# Patient Record
Sex: MALE | Race: WHITE | NOT HISPANIC OR LATINO | Employment: OTHER | URBAN - METROPOLITAN AREA
[De-identification: names, ages, dates, MRNs, and addresses within clinical notes are randomized per-mention and may not be internally consistent; named-entity substitution may affect disease eponyms.]

---

## 2019-01-08 ENCOUNTER — TRANSFERRED RECORDS (OUTPATIENT)
Dept: HEALTH INFORMATION MANAGEMENT | Facility: CLINIC | Age: 64
End: 2019-01-08

## 2019-10-15 ENCOUNTER — OFFICE VISIT (OUTPATIENT)
Dept: INTERNAL MEDICINE | Facility: CLINIC | Age: 64
End: 2019-10-15
Payer: COMMERCIAL

## 2019-10-15 ENCOUNTER — TRANSFERRED RECORDS (OUTPATIENT)
Dept: INTERNAL MEDICINE | Facility: CLINIC | Age: 64
End: 2019-10-15

## 2019-10-15 VITALS
OXYGEN SATURATION: 100 % | SYSTOLIC BLOOD PRESSURE: 110 MMHG | WEIGHT: 185.2 LBS | HEIGHT: 69 IN | HEART RATE: 54 BPM | TEMPERATURE: 97.6 F | BODY MASS INDEX: 27.43 KG/M2 | DIASTOLIC BLOOD PRESSURE: 78 MMHG

## 2019-10-15 DIAGNOSIS — Z13.6 CARDIOVASCULAR SCREENING; LDL GOAL LESS THAN 130: ICD-10-CM

## 2019-10-15 DIAGNOSIS — Z12.5 SCREENING FOR PROSTATE CANCER: ICD-10-CM

## 2019-10-15 DIAGNOSIS — Z29.89 ALTITUDE SICKNESS PROPHYLAXIS: ICD-10-CM

## 2019-10-15 DIAGNOSIS — R73.01 ELEVATED FASTING GLUCOSE: ICD-10-CM

## 2019-10-15 DIAGNOSIS — E78.5 HYPERLIPIDEMIA LDL GOAL <130: ICD-10-CM

## 2019-10-15 DIAGNOSIS — Z00.00 ROUTINE GENERAL MEDICAL EXAMINATION AT A HEALTH CARE FACILITY: Primary | ICD-10-CM

## 2019-10-15 DIAGNOSIS — Z71.84 COUNSELING ABOUT TRAVEL: ICD-10-CM

## 2019-10-15 LAB
ALBUMIN SERPL-MCNC: 4.5 G/DL (ref 3.4–5)
ALP SERPL-CCNC: 55 U/L (ref 40–150)
ALT SERPL W P-5'-P-CCNC: 30 U/L (ref 0–70)
ANION GAP SERPL CALCULATED.3IONS-SCNC: 5 MMOL/L (ref 3–14)
AST SERPL W P-5'-P-CCNC: 22 U/L (ref 0–45)
BILIRUB SERPL-MCNC: 1.1 MG/DL (ref 0.2–1.3)
BUN SERPL-MCNC: 16 MG/DL (ref 7–30)
CALCIUM SERPL-MCNC: 9.7 MG/DL (ref 8.5–10.1)
CHLORIDE SERPL-SCNC: 103 MMOL/L (ref 94–109)
CHOLEST SERPL-MCNC: 158 MG/DL
CO2 SERPL-SCNC: 31 MMOL/L (ref 20–32)
CREAT SERPL-MCNC: 0.93 MG/DL (ref 0.66–1.25)
ERYTHROCYTE [DISTWIDTH] IN BLOOD BY AUTOMATED COUNT: 12.2 % (ref 10–15)
GFR SERPL CREATININE-BSD FRML MDRD: 87 ML/MIN/{1.73_M2}
GLUCOSE SERPL-MCNC: 113 MG/DL (ref 70–99)
HBA1C MFR BLD: 5 % (ref 0–5.6)
HCT VFR BLD AUTO: 44.4 % (ref 40–53)
HDLC SERPL-MCNC: 62 MG/DL
HGB BLD-MCNC: 14.8 G/DL (ref 13.3–17.7)
LDLC SERPL CALC-MCNC: 83 MG/DL
MCH RBC QN AUTO: 31.6 PG (ref 26.5–33)
MCHC RBC AUTO-ENTMCNC: 33.3 G/DL (ref 31.5–36.5)
MCV RBC AUTO: 95 FL (ref 78–100)
NONHDLC SERPL-MCNC: 96 MG/DL
PLATELET # BLD AUTO: 203 10E9/L (ref 150–450)
POTASSIUM SERPL-SCNC: 4.6 MMOL/L (ref 3.4–5.3)
PROT SERPL-MCNC: 8.1 G/DL (ref 6.8–8.8)
PSA SERPL-ACNC: 1.26 UG/L (ref 0–4)
RBC # BLD AUTO: 4.68 10E12/L (ref 4.4–5.9)
SODIUM SERPL-SCNC: 139 MMOL/L (ref 133–144)
TRIGL SERPL-MCNC: 63 MG/DL
WBC # BLD AUTO: 5.1 10E9/L (ref 4–11)

## 2019-10-15 PROCEDURE — 99213 OFFICE O/P EST LOW 20 MIN: CPT | Mod: 25 | Performed by: INTERNAL MEDICINE

## 2019-10-15 PROCEDURE — 99386 PREV VISIT NEW AGE 40-64: CPT | Mod: 25 | Performed by: INTERNAL MEDICINE

## 2019-10-15 PROCEDURE — 83036 HEMOGLOBIN GLYCOSYLATED A1C: CPT | Performed by: INTERNAL MEDICINE

## 2019-10-15 PROCEDURE — 85027 COMPLETE CBC AUTOMATED: CPT | Performed by: INTERNAL MEDICINE

## 2019-10-15 PROCEDURE — 80053 COMPREHEN METABOLIC PANEL: CPT | Performed by: INTERNAL MEDICINE

## 2019-10-15 PROCEDURE — 36415 COLL VENOUS BLD VENIPUNCTURE: CPT | Performed by: INTERNAL MEDICINE

## 2019-10-15 PROCEDURE — 80061 LIPID PANEL: CPT | Performed by: INTERNAL MEDICINE

## 2019-10-15 PROCEDURE — 90471 IMMUNIZATION ADMIN: CPT | Performed by: INTERNAL MEDICINE

## 2019-10-15 PROCEDURE — G0103 PSA SCREENING: HCPCS | Performed by: INTERNAL MEDICINE

## 2019-10-15 PROCEDURE — 90682 RIV4 VACC RECOMBINANT DNA IM: CPT | Performed by: INTERNAL MEDICINE

## 2019-10-15 RX ORDER — ROSUVASTATIN CALCIUM 10 MG/1
TABLET, COATED ORAL
Refills: 0 | COMMUNITY
Start: 2019-09-23 | End: 2019-10-15

## 2019-10-15 RX ORDER — ROSUVASTATIN CALCIUM 10 MG/1
10 TABLET, COATED ORAL DAILY
Qty: 90 TABLET | Refills: 3 | Status: SHIPPED | OUTPATIENT
Start: 2019-10-15 | End: 2020-10-21

## 2019-10-15 RX ORDER — CIPROFLOXACIN 500 MG/1
500 TABLET, FILM COATED ORAL 2 TIMES DAILY
Qty: 6 TABLET | Refills: 0 | Status: SHIPPED | OUTPATIENT
Start: 2019-10-15 | End: 2019-10-18

## 2019-10-15 RX ORDER — ATOVAQUONE AND PROGUANIL HYDROCHLORIDE 250; 100 MG/1; MG/1
TABLET, FILM COATED ORAL
Qty: 40 TABLET | Refills: 0 | Status: SHIPPED | OUTPATIENT
Start: 2019-10-15 | End: 2019-11-24

## 2019-10-15 RX ORDER — FLUTICASONE PROPIONATE 50 MCG
1 SPRAY, SUSPENSION (ML) NASAL DAILY
COMMUNITY
End: 2020-10-22

## 2019-10-15 RX ORDER — ACETAZOLAMIDE 250 MG/1
250 TABLET ORAL DAILY
Qty: 10 TABLET | Refills: 0 | Status: SHIPPED | OUTPATIENT
Start: 2019-10-15 | End: 2020-10-21

## 2019-10-15 ASSESSMENT — MIFFLIN-ST. JEOR: SCORE: 1625.44

## 2019-10-15 NOTE — PATIENT INSTRUCTIONS
"*  Continue all medications at the same doses.  Contact your usual pharmacy if you need refills.     *  Altitude sickness prevention:     --Acetazoloamide (Diamox) 250 mg once per day while at altitude.      --be sure to drink plenty of fluids (remember \"clear and copious\" for the urine)      Issue to consider for travel overseas:  =========================================================    Try to drink only clean water.  This will lower the chance of traveller's diarrhea.    Bring OTC meds such as Tylenol, Mortin, cold medication and benadryl.    Benadryl can help with sleeping, help with allergic reactions.    TUMS, Maalox, Zantac all can help upset stomachs.    Insect bite prevention.    Malaria prevention as indicated by prevalence of malaria in your destination:    *  Based on your destination, malaria prevetnion is indicated.       To prevent malaria: take Malarone once per day every day, starting 1-2 days before entering the malaria infested area, every day in the area and for 1 week after leaving the malaria area.       *  Based on your destination, vaccination against Typhoid fever is  indicated.    ORAL TYPHOID VACCINE:  To pervent typhoid fever, take Vivotif tablets, every other day for a total of 4 tablets.  Be sure to complete the oral typhoid vaccine 2 weeks before travel into the area in order to give it enough time to take affect.  Take one capsule every other day for 4 doses. The capsules should be kept refrigerated (not frozen), and all 4 doses must be taken to achieve maximum efficacy. Each capsule should be taken with cool liquid no warmer than 98.6 F (37 C), and either 1 hour before a meal and/or 2 hours after a previous meal.     *  Ciprofloxin 500 mg twice per day for 3 days in the event of SEVERE diarrhea only.    Do not take for simple loose stools or mild diarrhea.   Ciprofloxin works very well for travellrs diarrhea, but can also cause a specific bad type fo diarrhea on its own which is " "why we want to be careful with using it only if needed.      *  For simple mild loose stools, take Pepto Bismol tablest or liquid.  This will usually work.  Beware, this may make your stools turn dark or black, but not the kind of black stools that we consider dangerous or indicating intestinal problems.      *  If you find out about any other vaccinations or recommendation specific to your destination, be sure to let us know.        SHINGLES VACCINE:        I would recommend that you consider getting a \"shingles vaccine\".  The shingles vaccine does not stop you from getting shingles, but it decreases the intensity of the event, the duration of the event, and decreases the painful nerve condition that results     There are two options available for shingles vaccines:     --Shingrix: 2 shots, give 2-6 months apart  **recommended**   OR   --Zostavax, one shot       Based on the available data, the Shingrix vaccine has superior benefit and should be considered even if you have had the Zostavax vaccine before.         Contact your insurance provider and ask them if either shingles vaccine is covered and is so, how much it will cost you.  Usually this will be cheaper to get in a pharmacy given by the pharmacist.       Regardless of the coverage, I would recommend that you consider the vaccine since shingles is very painful, (just ask anyone who has ever had it).          5 GOALS TO PREVENT VASCULAR DISEASE:     1.  Aggressive blood pressure control, under 130/80 ideally.  Using medications if needed.    Your blood pressure is under good control    BP Readings from Last 4 Encounters:   10/15/19 110/78       2.  Aggressive LDL cholesterol (\"bad cholesterol\") lowering as indicated.    Your goal is an LDL under 130 for sure, preferably under 100.  (If you have diabetes or previous vascular disease, the the LDL goals would be under 100 for sure, preferably under 70.)    New guidelines identify four high-risk groups who could " "benefit from statins:   *people with pre-existing heart disease, such as those who have had a heart attack;   *people ages 40 to 75 who have diabetes of any type  *patients ages 40 to 75 with at least a 7.5% risk of developing cardiovascular disease over the next decade, according to a formula described in the guidelines  *patients with the sort of super-high cholesterol that sometimes runs in families, as evidenced by an LDL of 190 milligrams per deciliter or higher    Your cholesterol levels are well controlled.    Based on cardiac disease risk factors elevation, you should remain on the Rosuvastatin.     3.  Aggressive diabetic prevention, screening and/or management.      You do not have diabetes as of the most recent blood tests.     4.  No smoking    5.  Consider Daily aspirin: Have a discussion about the relative benefits and risks of taking daily low dose aspirin (81 mg) tablet once per day over the age of 50, unless there is a specific reason that you cannot take aspirin (such as side effect, allergy, or you are on another \"blood thinner\").        --Based on your current cardiac risk factors, you should take regular daily Aspirin 81 mg once per day, unless you have any reasons (side effects, intolerance, etc.) that you cannot take aspirin.         Preventive Health Recommendations  Male Ages 50 - 64    Yearly exam:             See your health care provider every year in order to  o   Review health changes.   o   Discuss preventive care.    o   Review your medicines if your doctor has prescribed any.     Have a cholesterol test every 5 years, or more frequently if you are at risk for high cholesterol/heart disease.     Have a diabetes test (fasting glucose) every three years. If you are at risk for diabetes, you should have this test more often.     Have a colonoscopy at age 50, or have a yearly FIT test (stool test). These exams will check for colon cancer.      Talk with your health care provider about " "whether or not a prostate cancer screening test (PSA) is right for you.    You should be tested each year for STDs (sexually transmitted diseases), if you re at risk.     Shots: Get a flu shot each year. Get a tetanus shot every 10 years.     Nutrition:    Eat at least 5 servings of fruits and vegetables daily.     Eat whole-grain bread, whole-wheat pasta and brown rice instead of white grains and rice.     Get adequate Calcium and Vitamin D.        --Good Grains:  Oats, brown rice, Quinoa (these do not raise the blood sugar as much)     --Bad grains:  Anything made from wheat or white rice     (because these raise the blood sugars significantly, and the possible gluten issue from wheat for some people).      --Proteins:  Aim for \"lean proteins\" including chicken, fish, seafood, pork, turkey, and eggs (in moderation); Eat red meat only occasionally          Abiel Tan:                Lifestyle    Exercise for at least 150 minutes a week (30 minutes a day, 5 days a week). This will help you control your weight and prevent disease.     Limit alcohol to one drink per day.     No smoking.     Wear sunscreen to prevent skin cancer.     See your dentist every six months for an exam and cleaning.     See your eye doctor every 1 to 2 years.    "

## 2019-10-15 NOTE — LETTER
October 15, 2019      Edd Mcrae  [address] [place] [address]        Dear ,    We are writing to inform you of your test results.    Your labs all look great.  The fasting glucose level was slightly elevated however the normal hemoglobin A1c means there is no diabetes present.  The most likely explanation for an elevated fasting glucose can be that she were fasting very well and your blood sugar may actually have been low at the time he woke up.  As we discussed during appointment, I would recommend continue the rosuvastatin at the same dose once per day.  Contact your pharmacy when you need refills.        Resulted Orders   Prostate spec antigen screen   Result Value Ref Range    PSA 1.26 0 - 4 ug/L      Comment:      Assay Method:  Chemiluminescence using Siemens Vista analyzer   CBC with platelets   Result Value Ref Range    WBC 5.1 4.0 - 11.0 10e9/L    RBC Count 4.68 4.4 - 5.9 10e12/L    Hemoglobin 14.8 13.3 - 17.7 g/dL    Hematocrit 44.4 40.0 - 53.0 %    MCV 95 78 - 100 fl    MCH 31.6 26.5 - 33.0 pg    MCHC 33.3 31.5 - 36.5 g/dL    RDW 12.2 10.0 - 15.0 %    Platelet Count 203 150 - 450 10e9/L   Comprehensive metabolic panel   Result Value Ref Range    Sodium 139 133 - 144 mmol/L    Potassium 4.6 3.4 - 5.3 mmol/L    Chloride 103 94 - 109 mmol/L    Carbon Dioxide 31 20 - 32 mmol/L    Anion Gap 5 3 - 14 mmol/L    Glucose 113 (H) 70 - 99 mg/dL    Urea Nitrogen 16 7 - 30 mg/dL    Creatinine 0.93 0.66 - 1.25 mg/dL    GFR Estimate 87 >60 mL/min/[1.73_m2]      Comment:      Non  GFR Calc  Starting 12/18/2018, serum creatinine based estimated GFR (eGFR) will be   calculated using the Chronic Kidney Disease Epidemiology Collaboration   (CKD-EPI) equation.      GFR Estimate If Black >90 >60 mL/min/[1.73_m2]      Comment:       GFR Calc  Starting 12/18/2018, serum creatinine based estimated GFR (eGFR) will be   calculated using the Chronic Kidney Disease  "Epidemiology Collaboration   (CKD-EPI) equation.      Calcium 9.7 8.5 - 10.1 mg/dL    Bilirubin Total 1.1 0.2 - 1.3 mg/dL    Albumin 4.5 3.4 - 5.0 g/dL    Protein Total 8.1 6.8 - 8.8 g/dL    Alkaline Phosphatase 55 40 - 150 U/L    ALT 30 0 - 70 U/L    AST 22 0 - 45 U/L   Lipid panel reflex to direct LDL Fasting   Result Value Ref Range    Cholesterol 158 <200 mg/dL    Triglycerides 63 <150 mg/dL    HDL Cholesterol 62 >39 mg/dL    LDL Cholesterol Calculated 83 <100 mg/dL      Comment:      Desirable:       <100 mg/dl    Non HDL Cholesterol 96 <130 mg/dL   Hemoglobin A1c   Result Value Ref Range    Hemoglobin A1C 5.0 0 - 5.6 %      Comment:      Normal <5.7% Prediabetes 5.7-6.4%  Diabetes 6.5% or higher - adopted from ADA   consensus guidelines.       Contact me if you have questions related to travel to [facility], but I think we covered your issues.  Return to see me in 1 year, sooner if needed.  Please get fasting labs done at the [facility] or any other [facility] lab 1-2 days before this appointment (schedule a \"lab appointment\" for this).  If you get the labs done at another [facility], make arrangements with them directly.  The orders will be in place.  Eat nothing for at least 8 hours prior to having these labs drawn.  Use [employer] or Call [phone] to schedule the appointment with me and lab appointment.     If you have any questions or concerns, please call the clinic at the number listed above.       Sincerely,        Storm Tamayo MD  "

## 2019-10-15 NOTE — NURSING NOTE
"Chief Complaint   Patient presents with     Physical     /78   Pulse 54   Temp 97.6  F (36.4  C) (Temporal)   Ht 1.753 m (5' 9\")   Wt 84 kg (185 lb 3.2 oz)   SpO2 100%   BMI 27.35 kg/m   Estimated body mass index is 27.35 kg/m  as calculated from the following:    Height as of this encounter: 1.753 m (5' 9\").    Weight as of this encounter: 84 kg (185 lb 3.2 oz).  Medication Reconciliation: complete      Health Maintenance that is potentially due pending provider review:  Colonoscopy/FIT    Pt states he is up to date on Colonoscopy, care everywhere down currently, signing CASANDRA to get records from [facility].    MAURY Carvajal  "

## 2019-10-15 NOTE — Clinical Note
Please abstract the following data from this visit with this patient into the appropriate field in Epic:Tests that can be patient reported without a hard copy:Colonoscopy done on this date: 2011 (approximately), by this group: [facility], results were negative. Other Tests found in the patient's chart through Chart Review/Care Everywhere:{Abstract Quality List (Optional):385015}Note to Abstraction: If this section is blank, no results were found via Chart Review/Care Everywhere.

## 2019-10-15 NOTE — PROGRESS NOTES
"SUBJECTIVE:   CC: Edd Mcrae is an 64 year old male who presents for preventative health visit.     Discussed hat additional charges may be applied for addressing concerns at today's visit above and beyond what is covered by the \"routine physical exam\" diagnosis code.  Patient verbalized understanding and would like additional concerns addressed today.    Spent greater than 15 minutes above and beyond routine Health Care Maintenence issues on the above mentioned additional problem(s).     Healthy Habits:     Getting at least 3 servings of Calcium per day:  Yes    Bi-annual eye exam:  Yes    Dental care twice a year:  Yes    Sleep apnea or symptoms of sleep apnea:  None    Diet:  Regular (no restrictions)    Frequency of exercise:  4-5 days/week    Duration of exercise:  15-30 minutes    Taking medications regularly:  Yes    Medication side effects:  None    PHQ-2 Total Score: 0    Additional concerns today:  Yes    In addition to routine healthcare maintenance, the patient specifically wishes to address the issues listed below.    1.  Traveling to [facility] in January 2024 approximately 10 days.  Will be climbing [facility] above 6000 feet altitude.  Has a list of medications and requirements for this travel.  Has completed hepatitis A and hepatitis B vaccination in the past.  Typhoid vaccination recommended, Had IM typhoid vaccine 2006.  There will be malaria in the area, malaria chemoprophylaxis was recommended.  Recommended to take traveler's diarrhea antibiotics with him.  They recommended Diamox (acetazolamide) for prevention of altitude sickness    2.  Has history of hyperlipidemia.  On statin for this since 2018, denies any significant side effects of this medication.   He was started on statin due to an elevated ten-year risk of cardiac event of over 7.5%.    Latest labs from [facility] reviewed.      Has taken rosuvastatin without reported side effects.      3.  History of intermittent elevated " fasting glucose with normal hemoglobin A1Cs.      Reviewed all available data from [employer] system via care everywhere.    Patient is very concerned about diabetes because of his brother with type 1 diabetes.        Today's PHQ-2 Score:   PHQ-2 ( 1999 Pfizer) 10/15/2019   Q1: Little interest or pleasure in doing things 0   Q2: Feeling down, depressed or hopeless 0   PHQ-2 Score 0   Q1: Little interest or pleasure in doing things Not at all   Q2: Feeling down, depressed or hopeless Not at all   PHQ-2 Score 0       Abuse: Current or Past(Physical, Sexual or Emotional)- No  Do you feel safe in your environment? Yes    Social History     Tobacco Use     Smoking status: Former Smoker     Packs/day: 0.00     Smokeless tobacco: Former User     Quit date: 1/1/2009     Tobacco comment: former smoker, 1/4-1/2 ppd off an on ages [age]   Substance Use Topics     Alcohol use: Yes         Alcohol Use 10/15/2019   Prescreen: >3 drinks/day or >7 drinks/week? Yes   Prescreen: >3 drinks/day or >7 drinks/week? -   AUDIT SCORE  4     AUDIT - Alcohol Use Disorders Identification Test - Reproduced from the World Health Organization Audit 2001 (Second Edition) 10/15/2019   1.  How often do you have a drink containing alcohol? 4 or more times a week   2.  How many drinks containing alcohol do you have on a typical day when you are drinking? 1 or 2   3.  How often do you have five or more drinks on one occasion? Never   4.  How often during the last year have you found that you were not able to stop drinking once you had started? Never   5.  How often during the last year have you failed to do what was normally expected of you because of drinking? Never   6.  How often during the last year have you needed a first drink in the morning to get yourself going after a heavy drinking session? Never   7.  How often during the last year have you had a feeling of guilt or remorse after drinking? Never   8.  How often during the last year have  "you been unable to remember what happened the night before because of your drinking? Never   9.  Have you or someone else been injured because of your drinking? No   10. Has a relative, friend, doctor or other health care worker been concerned about your drinking or suggested you cut down? No   TOTAL SCORE 4       Last PSA:   PSA   Date Value Ref Range Status   10/15/2019 1.26 0 - 4 ug/L Final     Comment:     Assay Method:  Chemiluminescence using Siemens Vista analyzer       Reviewed orders with patient. Reviewed health maintenance and updated orders accordingly - Yes      Reviewed and updated as needed this visit by clinical staff  Tobacco  Allergies  Meds  Problems  Med Hx  Surg Hx  Fam Hx  Soc Hx          Reviewed and updated as needed this visit by Provider  Tobacco  Problems  Med Hx  Surg Hx  Fam Hx  Soc Hx           Review of Systems  CONSTITUTIONAL: NEGATIVE for fever, chills, change in weight  INTEGUMENTARY/SKIN: NEGATIVE for worrisome rashes, moles or lesions  EYES: NEGATIVE for vision changes or irritation  ENT: NEGATIVE for ear, mouth and throat problems  RESP: NEGATIVE for significant cough or SOB  CV: NEGATIVE for chest pain, palpitations or peripheral edema  GI: NEGATIVE for nausea, abdominal pain, heartburn, or change in bowel habits   male: negative for dysuria, hematuria, decreased urinary stream, erectile dysfunction, urethral discharge  MUSCULOSKELETAL: NEGATIVE for significant arthralgias or myalgia  NEURO: NEGATIVE for weakness, dizziness or paresthesias  PSYCHIATRIC: NEGATIVE for changes in mood or affect    OBJECTIVE:   /78   Pulse 54   Temp 97.6  F (36.4  C) (Temporal)   Ht 1.753 m (5' 9\")   Wt 84 kg (185 lb 3.2 oz)   SpO2 100%   BMI 27.35 kg/m      Physical Exam    GENERAL alert and no distress  EYES:  Normal sclera,conjunctiva, EOMI  HENT: oral and posterior pharynx without lesions or erythema, facies symmetric  NECK: Neck supple. No LAD, without " thyroidmegaly.  RESP: Clear to ausculation bilaterally without wheezes or crackles. Normal BS in all fields.  CV: RRR normal S1S2 without murmurs, rubs or gallops.  LYMPH: no cervical lymph adenopathy appreciated  MS: extremities- no gross deformities of the visible extremities noted,   EXT:  no lower extremity edema  PSYCH: Alert and oriented times 3; speech- coherent  SKIN:  No obvious significant skin lesions on visible portions of face         ASSESSMENT/PLAN:     (Z00.00) Routine general medical examination at a health care facility  (primary encounter diagnosis)  Comment: Discussed cardiac disease risk factor modification including screening for and treating HTN, lipids, DM, and smoking cessation.  Also discussed age appropriate cancer screening recommendations including testicular, prostate, colon and lung cancer as dictated by age group.  Recommended low fat, low salt diet and moderation in any alcohol intake.  Recommended always using seatbelts when in a car.  Recommended never driving after drinking or riding with someone who has been drinking as well.       Plan:     (Z71.84) Counseling about travel  Comment: Discussed issues of general travel medicine.    Discussed importance of water purity and traveller's diarrhea.  Gave RX for Ciprofloxin 500 BID for 3 days in the event of severe traveller's diarrhea, (discussed difference between routine loose stool from travelling vs traveller's diarrhea).  Pt voices understanding how and when to use the abx.    Based on pts travel destination and travel plans, malaria prophylaxis is indicated.  If prophylaxis indicated, discussed either Malarone daily (start 2 days before travel, continue until for one week after return) or mefloquine (start 1 week before, weeks there and 4 weeks after return).   The patient does need typhoid vaccine (if needed will give oral typhoid vaccine tablets, every other day for 4 doses, ideally to be completed 2 weeks before arrival at  destination)  Discussed general bug avoidance measures.    Gave recs as to OTC meds to bring along.   Plan: typhoid (VIVOTIF) CR capsule,         atovaquone-proguanil (MALARONE) 250-100 MG         tablet, ciprofloxacin (CIPRO) 500 MG tablet,         acetaZOLAMIDE (DIAMOX) 250 MG tablet, Lipid         panel reflex to direct LDL Fasting,         Comprehensive metabolic panel, CBC with         platelets            (E78.5) Hyperlipidemia LDL goal <130  Comment: Due to his 10-year cardiac disease risk at 8.1% based on his most recent cholesterol numbers from his former clinic last year.  Based on this information, he would benefit from a statin for secondary prevention purposes.    This condition is currently controlled on the current medical regimen.  Continue current therapy.  Discussed current lipid results, previous results (if available) current guidelines (NCEP) for treatment and goals for lipids.  Discussed lifestyle modification, dietary changes (low fat, low simple carb) and regular aerobic exercise.  Discussed the link between dysmetabolic syndrome and impaired glucose tolerance seen in certain patterns of lipids.  Briefly discussed medication used for lipid lowering, including the statins are their possible side effects of myalgias, rhabdomyolysis, and liver toxicity.   Plan: rosuvastatin (CRESTOR) 10 MG tablet, Lipid         panel reflex to direct LDL Fasting,         Comprehensive metabolic panel, CBC with         platelets            (Z12.5) Screening for prostate cancer  Comment: Discussed prostate cancer screening and PSA blood test.  Discussed that an elevated PSA blood test can be caused by things other than prostate cancer, including infection/inflammation, BPH, and recent sexual activity; and that elevated PSA blood test may require further investigation with a urologist   Plan: Prostate spec antigen screen            (Z29.8) Altitude sickness prophylaxis  Comment: See the Sulamyd 250 mg once a day  "when he gets to altitude above 6000 feet, continue once daily while he remains at altitude, stop as he is descending below 6000 feet.  Plan: acetaZOLAMIDE (DIAMOX) 250 MG tablet            (Z13.6) CARDIOVASCULAR SCREENING; LDL GOAL LESS THAN 130  Comment: Discussed cardiac disease risk factors and cardiac disease risk factor modification.   Plan:     (R73.01) Elevated fasting glucose  Comment: History of elevated glucose levels with normal A1c's.  Most recently A1c 5.1 2018.  Plan: Hemoglobin A1c               COUNSELING:   Reviewed preventive health counseling, as reflected in patient instructions       Regular exercise       Healthy diet/nutrition       Vision screening       Hearing screening       Aspirin Prophylaxsis       Colon cancer screening       Prostate cancer screening    Estimated body mass index is 27.35 kg/m  as calculated from the following:    Height as of this encounter: 1.753 m (5' 9\").    Weight as of this encounter: 84 kg (185 lb 3.2 oz).          reports that he has quit smoking. He smoked 0.00 packs per day. He quit smokeless tobacco use about 10 years ago.      Counseling Resources:  ATP IV Guidelines  Pooled Cohorts Equation Calculator  FRAX Risk Assessment  ICSI Preventive Guidelines  Dietary Guidelines for Americans, 2010  [employer]'s MyPlate  ASA Prophylaxis  Lung CA Screening    Storm Tamayo MD  [facility]    Discussed hat additional charges may be applied for addressing concerns at today's visit above and beyond what is covered by the \"routine physical exam\" diagnosis code.  Patient verbalized understanding and would like additional concerns addressed today.    Spent greater than 15 minutes above and beyond routine Health Care Maintenence issues on the above mentioned additional problem(s).   "

## 2020-08-06 ENCOUNTER — OFFICE VISIT (OUTPATIENT)
Dept: OPTOMETRY | Facility: CLINIC | Age: 65
End: 2020-08-06
Payer: COMMERCIAL

## 2020-08-06 DIAGNOSIS — H40.003 GLAUCOMA SUSPECT, BILATERAL: ICD-10-CM

## 2020-08-06 DIAGNOSIS — H52.203 ASTIGMATISM OF BOTH EYES, UNSPECIFIED TYPE: ICD-10-CM

## 2020-08-06 DIAGNOSIS — H52.13 HIGH MYOPIA, BILATERAL: Primary | ICD-10-CM

## 2020-08-06 DIAGNOSIS — H25.13 NUCLEAR SCLEROSIS OF BOTH EYES: ICD-10-CM

## 2020-08-06 DIAGNOSIS — H52.4 PRESBYOPIA: ICD-10-CM

## 2020-08-06 PROCEDURE — 92004 COMPRE OPH EXAM NEW PT 1/>: CPT | Performed by: OPTOMETRIST

## 2020-08-06 PROCEDURE — 92015 DETERMINE REFRACTIVE STATE: CPT | Performed by: OPTOMETRIST

## 2020-08-06 ASSESSMENT — EXTERNAL EXAM - RIGHT EYE: OD_EXAM: NORMAL

## 2020-08-06 ASSESSMENT — REFRACTION_MANIFEST
OD_AXIS: 124
OD_SPHERE: -6.25
OS_SPHERE: -5.00
OD_SPHERE: -5.75
OD_ADD: +2.25
METHOD_AUTOREFRACTION: 1
OD_CYLINDER: +1.50
OS_SPHERE: -5.50
OD_AXIS: 135
OS_CYLINDER: +1.00
OS_ADD: +2.25
OS_AXIS: 013
OD_CYLINDER: +0.25
OS_AXIS: 180
OS_CYLINDER: +0.75

## 2020-08-06 ASSESSMENT — VISUAL ACUITY
METHOD: SNELLEN - LINEAR
OS_CC+: -1
OS_CC: 20/30+1
OD_CC: 20/25
OD_CC: 20/40-1
OD_CC+: -1
OS_CC: 20/20
OD_SC: 20/500
CORRECTION_TYPE: GLASSES
OS_SC: 20/500

## 2020-08-06 ASSESSMENT — CUP TO DISC RATIO
OS_RATIO: 0.7
OD_RATIO: 0.65

## 2020-08-06 ASSESSMENT — REFRACTION_WEARINGRX
OS_SPHERE: -5.00
OD_ADD: +1.75
OS_ADD: +1.75
OD_CYLINDER: +1.00
OD_SPHERE: -6.75
OD_AXIS: 126
OS_AXIS: 025
OS_CYLINDER: +0.75
SPECS_TYPE: PAL

## 2020-08-06 ASSESSMENT — SLIT LAMP EXAM - LIDS
COMMENTS: MEIBOMIAN GLAND DYSFUNCTION
COMMENTS: MEIBOMIAN GLAND DYSFUNCTION

## 2020-08-06 ASSESSMENT — TONOMETRY
OS_IOP_MMHG: 15
IOP_METHOD: APPLANATION
OD_IOP_MMHG: 15

## 2020-08-06 ASSESSMENT — CONF VISUAL FIELD
OD_NORMAL: 1
METHOD: COUNTING FINGERS
OS_NORMAL: 1

## 2020-08-06 ASSESSMENT — EXTERNAL EXAM - LEFT EYE: OS_EXAM: NORMAL

## 2020-08-06 NOTE — ADDENDUM NOTE
Addended by: ISA LECHUGA on: 8/6/2020 02:40 PM     Modules accepted: Orders     Lisinopril has been discontinued due to cough.  She can discontinue amlodipine if she is having side effects.  Start losartan 25 mg daily 30 tabs with 1 refill  Please send Rx and notify patient

## 2020-08-06 NOTE — PROGRESS NOTES
Chief Complaint   Patient presents with     Annual Eye Exam      GURPREET: 10/2017  Was seen at [facility]    flashes and floaters in 2019.   [facility] before that  Outside records in chart review.  Glaucoma suspect   Glasses updated in 2017.  Stable vision, lenses are a little scratched.  He notes that when he is getting tired, one eye is a little weaker than the other. It's more often his left eye, but not always.   No other concerns at this time.   No use of gtts.     Last Eye Exam: 2017  Dilated Previously: Yes    What are you currently using to see?  Glasses - PAL       Distance Vision Acuity: Satisfied with vision    Near Vision Acuity: Satisfied with vision while reading and using computer with glasses    Eye Comfort: good  Do you use eye drops? : No  Occupation or Hobbies: Likes to go fishing    Frances Mandujano CPO          Medical, surgical and family histories reviewed and updated 8/6/2020.     History of blepharitis  History of glaucoma suspect borderline thinning OCT w normal macula   Normal tensions  IOP 11 last year  OBJECTIVE: See Ophthalmology exam    ASSESSMENT:    ICD-10-CM    1. High myopia, bilateral  H52.13 REFRACTION     EYE EXAM (SIMPLE-NONBILLABLE)   2. Astigmatism of both eyes, unspecified type  H52.203    3. Presbyopia  H52.4    4. Glaucoma suspect, bilateral  H40.003 REFRACTION     EYE EXAM (SIMPLE-NONBILLABLE)     OPHTHALMOLOGY ADULT REFERRAL   5. Nuclear sclerosis of both eyes  H25.13         PLAN:   Highly recommend repeat OCT / [facility] or [facility]/ if he would like to stay in [facility]   Optional prescription   Lid hygiene as before     Kusum Juares OD

## 2020-08-06 NOTE — LETTER
8/6/2020         RE: Edd Mcrae  [address] [address]        Dear Colleague,    Thank you for referring your patient, Edd Mcrae, to the [facility]. Please see a copy of my visit note below.    Chief Complaint   Patient presents with     Annual Eye Exam      GURPREET: 10/2017  Was seen at [facility] for flashes and floaters in 2019.   Outside records in chart review.  Glaucoma suspect   Glasses updated in 2017.  Stable vision, lenses are a little scratched.  He notes that when he is getting tired, one eye is a little weaker than the other. It's more often his left eye, but not always.   No other concerns at this time.   No use of gtts.     Last Eye Exam: 2017  Dilated Previously: Yes    What are you currently using to see?  Glasses - PAL       Distance Vision Acuity: Satisfied with vision    Near Vision Acuity: Satisfied with vision while reading and using computer with glasses    Eye Comfort: good  Do you use eye drops? : No  Occupation or Hobbies: Likes to go fishing    Frances Mandujano CPO          Medical, surgical and family histories reviewed and updated 8/6/2020.     History of blepharitis  History of glaucoma suspect borderline thinning OCT w normal macula   Normal tensions    OBJECTIVE: See Ophthalmology exam    ASSESSMENT:    ICD-10-CM    1. High myopia, bilateral  H52.13 REFRACTION     EYE EXAM (SIMPLE-NONBILLABLE)   2. Astigmatism of both eyes, unspecified type  H52.203    3. Presbyopia  H52.4    4. Glaucoma suspect, bilateral  H40.003 REFRACTION     EYE EXAM (SIMPLE-NONBILLABLE)        PLAN:   Repeat OCT / [facility] or [facility]/ if he would like to stay in [facility]   Optional prescription   Lid hygiene as before     Kusum Juares OD     Again, thank you for allowing me to participate in the care of your patient.        Sincerely,        Kusum Juares, OD

## 2020-08-06 NOTE — PATIENT INSTRUCTIONS
Recommend annual OCT and VF   For glaucoma suspect to monitor for progression    I would recommend staying at the same clinic for this test so your results are analyzed consecuetively otherwise it is like starting over      start of cataracts in both eyes    Meibomian gland dysfunction or Posterior Blepharitis, is characterized by inflammation along both the uppper and lower eyelid margins. A single row of these glands is present in each lid with openings along the lid margins.  It is often found in association with skin conditions such as rosacea and seborrheic dermatitis.    Symptoms include:  ?Red eyes  ?Gritty or burning sensation  ?Excessive tearing  ?Itchy eyelids  ?Red, swollen eyelids  ?Crusting or matting of eyelashes in the morning  ?Light sensitivity  ?Blurred vision    It is important to keep cosmetics from blocking these oil glands. If blocked, they do not   excrete oil into the tear film, which causes the tears to evaporate quickly.   This may result in watery eyes.  There is also an increase of bacterial growth when the tear film is unstable, leading to further ocular surface inflammation.    Treatment:  1. Warm compresses for 5-10 minutes twice daily     2.  Keep the eyelid margins clean by using a commercial eye scrub or mild baby shampoo on a washcloth 1-2x daily    3. Use preservative free artificial tears 4-8x daily     For warm compresses    Moisten a washcloth with hot water, or microwave for 10 seconds, being careful to not get the cloth too hot.   Then put the washcloth onto your eyelids for 5 minutes. It will cool quickly so a rice pack or eyemask that can be heated and laid on top of the washcloth will help retain the heat.    Omega 3 fatty acid supplements taken once to twice daily and artificial tears such as Soothe xp, Refresh optive , Retaine and systane balance are also an additional treatment to control inflammation and help soothe your eyes.     Overabundance of bacterial  microorganisms along the eyelashes and lid margins induce stress on the tear film and promote inflammation.  Regular lid hygiene helps diminish the bacterial population to prevent inflammation and infection.  Use a warm compress to loosen crusts   Cleanse lids once daily with a lid cleansing product as directed such as Ocusoft or Sterilid which can be purchased at most pharmacies. Diluted baby shampoo will also work, but not as well as it dries the skin and can irritate eyelids.  Hypo chlor spray may also be used on closed lids.

## 2020-09-22 ENCOUNTER — NURSE TRIAGE (OUTPATIENT)
Dept: NURSING | Facility: CLINIC | Age: 65
End: 2020-09-22

## 2020-09-22 NOTE — TELEPHONE ENCOUNTER
Calling with general health questions and recommendations.  Discussed importance of continuing yearly exam with PCP.   Discussed importance of influenza vaccine and shingles vaccine.  All questions answered.     Lily Miranda RN  [facility]    Additional Information    Health Information question, no triage required and triager able to answer question    Protocols used: INFORMATION ONLY CALL-A-AH

## 2020-10-21 ENCOUNTER — OFFICE VISIT (OUTPATIENT)
Dept: INTERNAL MEDICINE | Facility: CLINIC | Age: 65
End: 2020-10-21
Payer: COMMERCIAL

## 2020-10-21 VITALS
OXYGEN SATURATION: 99 % | WEIGHT: 187.7 LBS | DIASTOLIC BLOOD PRESSURE: 72 MMHG | BODY MASS INDEX: 27.8 KG/M2 | TEMPERATURE: 96.9 F | HEIGHT: 69 IN | HEART RATE: 60 BPM | SYSTOLIC BLOOD PRESSURE: 134 MMHG

## 2020-10-21 DIAGNOSIS — Z23 NEED FOR INFLUENZA VACCINATION: ICD-10-CM

## 2020-10-21 DIAGNOSIS — Z13.6 CARDIOVASCULAR SCREENING; LDL GOAL LESS THAN 130: ICD-10-CM

## 2020-10-21 DIAGNOSIS — Z12.11 SCREENING FOR COLON CANCER: ICD-10-CM

## 2020-10-21 DIAGNOSIS — Z12.11 SCREEN FOR COLON CANCER: ICD-10-CM

## 2020-10-21 DIAGNOSIS — Z00.00 ROUTINE GENERAL MEDICAL EXAMINATION AT A HEALTH CARE FACILITY: Primary | ICD-10-CM

## 2020-10-21 DIAGNOSIS — Z12.5 SCREENING FOR PROSTATE CANCER: ICD-10-CM

## 2020-10-21 DIAGNOSIS — Z23 NEED FOR VACCINATION: ICD-10-CM

## 2020-10-21 DIAGNOSIS — E78.5 HYPERLIPIDEMIA LDL GOAL <130: ICD-10-CM

## 2020-10-21 LAB
ALT SERPL W P-5'-P-CCNC: 36 U/L (ref 0–70)
ANION GAP SERPL CALCULATED.3IONS-SCNC: 5 MMOL/L (ref 3–14)
AST SERPL W P-5'-P-CCNC: 30 U/L (ref 0–45)
BUN SERPL-MCNC: 18 MG/DL (ref 7–30)
CALCIUM SERPL-MCNC: 11.2 MG/DL (ref 8.5–10.1)
CHLORIDE SERPL-SCNC: 104 MMOL/L (ref 94–109)
CHOLEST SERPL-MCNC: 153 MG/DL
CO2 SERPL-SCNC: 28 MMOL/L (ref 20–32)
CREAT SERPL-MCNC: 0.94 MG/DL (ref 0.66–1.25)
ERYTHROCYTE [DISTWIDTH] IN BLOOD BY AUTOMATED COUNT: 12.3 % (ref 10–15)
GFR SERPL CREATININE-BSD FRML MDRD: 85 ML/MIN/{1.73_M2}
GLUCOSE SERPL-MCNC: 98 MG/DL (ref 70–99)
HCT VFR BLD AUTO: 44.3 % (ref 40–53)
HDLC SERPL-MCNC: 60 MG/DL
HGB BLD-MCNC: 14.5 G/DL (ref 13.3–17.7)
LDLC SERPL CALC-MCNC: 77 MG/DL
MCH RBC QN AUTO: 31.3 PG (ref 26.5–33)
MCHC RBC AUTO-ENTMCNC: 32.7 G/DL (ref 31.5–36.5)
MCV RBC AUTO: 96 FL (ref 78–100)
NONHDLC SERPL-MCNC: 93 MG/DL
PLATELET # BLD AUTO: 198 10E9/L (ref 150–450)
POTASSIUM SERPL-SCNC: 5 MMOL/L (ref 3.4–5.3)
PSA SERPL-ACNC: 1.11 UG/L (ref 0–4)
RBC # BLD AUTO: 4.63 10E12/L (ref 4.4–5.9)
SODIUM SERPL-SCNC: 137 MMOL/L (ref 133–144)
TRIGL SERPL-MCNC: 79 MG/DL
WBC # BLD AUTO: 5.1 10E9/L (ref 4–11)

## 2020-10-21 PROCEDURE — 90472 IMMUNIZATION ADMIN EACH ADD: CPT | Performed by: INTERNAL MEDICINE

## 2020-10-21 PROCEDURE — 84460 ALANINE AMINO (ALT) (SGPT): CPT | Performed by: INTERNAL MEDICINE

## 2020-10-21 PROCEDURE — 84450 TRANSFERASE (AST) (SGOT): CPT | Performed by: INTERNAL MEDICINE

## 2020-10-21 PROCEDURE — 80048 BASIC METABOLIC PNL TOTAL CA: CPT | Performed by: INTERNAL MEDICINE

## 2020-10-21 PROCEDURE — 90682 RIV4 VACC RECOMBINANT DNA IM: CPT | Performed by: INTERNAL MEDICINE

## 2020-10-21 PROCEDURE — 90750 HZV VACC RECOMBINANT IM: CPT | Performed by: INTERNAL MEDICINE

## 2020-10-21 PROCEDURE — 90471 IMMUNIZATION ADMIN: CPT | Performed by: INTERNAL MEDICINE

## 2020-10-21 PROCEDURE — 80061 LIPID PANEL: CPT | Performed by: INTERNAL MEDICINE

## 2020-10-21 PROCEDURE — 85027 COMPLETE CBC AUTOMATED: CPT | Performed by: INTERNAL MEDICINE

## 2020-10-21 PROCEDURE — G0103 PSA SCREENING: HCPCS | Performed by: INTERNAL MEDICINE

## 2020-10-21 PROCEDURE — 99213 OFFICE O/P EST LOW 20 MIN: CPT | Mod: 25 | Performed by: INTERNAL MEDICINE

## 2020-10-21 PROCEDURE — 99396 PREV VISIT EST AGE 40-64: CPT | Mod: 25 | Performed by: INTERNAL MEDICINE

## 2020-10-21 RX ORDER — ROSUVASTATIN CALCIUM 10 MG/1
10 TABLET, COATED ORAL DAILY
Qty: 90 TABLET | Refills: 3 | Status: SHIPPED | OUTPATIENT
Start: 2020-10-21 | End: 2021-10-26

## 2020-10-21 RX ORDER — ASPIRIN 81 MG/1
81 TABLET ORAL DAILY
COMMUNITY
End: 2021-10-26

## 2020-10-21 SDOH — HEALTH STABILITY: MENTAL HEALTH: HOW OFTEN DO YOU HAVE A DRINK CONTAINING ALCOHOL?: 4 OR MORE TIMES A WEEK

## 2020-10-21 SDOH — HEALTH STABILITY: MENTAL HEALTH: HOW MANY STANDARD DRINKS CONTAINING ALCOHOL DO YOU HAVE ON A TYPICAL DAY?: 1 OR 2

## 2020-10-21 SDOH — HEALTH STABILITY: MENTAL HEALTH: HOW OFTEN DO YOU HAVE 6 OR MORE DRINKS ON ONE OCCASION?: NEVER

## 2020-10-21 ASSESSMENT — MIFFLIN-ST. JEOR: SCORE: 1631.78

## 2020-10-21 NOTE — PATIENT INSTRUCTIONS
"*  Annual influenza vaccine given.     *  Colon Cancer Screening:  ============================================================  *  All men and women are recommended to have some sort of formal colon cancer screening starting at age of 50 (or earlier if indicated based on family history of colon cancer.     *  Colon cancer is a preventable type of cancer that if caught early can be easily treated even before it becomes cancer by removing the precancerous.      *  Common Colon cancer screening options:     --Colonoscopy (every 10 years if normal exam, no family history of colon cancer)  I place order and you will be contacted to arrange this procedure.   Pros: most complete and thorough exam, identify and remove any pre-cancerous polyps.   Cons: prep before procedure, sedation required, possible cost     --ColoGuard Stool test every 3 years (be sure to confirm coverage by insurance).  This test checks for colon cancer specific DNA in the stool.  You will receive the collection kit in the mail.  Return the samples via mail.  If positive, you do not necessarily have colon cancer, but will need a colonoscopy.  Most insurance cover this test, but please check with your insurance to confirm this.    Go their web site for more information:  [email or web address]  Pros: easy to collect and return, decent specific screening test, newer test  Cons: cost (if not covered by insurance)     --\"FIT\" Stool test once per year.    Return the \"FIT\" stool test to us via mail.  This is a basic level screening for colon cancer by detecting trace amount of occult blood in the intestinal tract.  If the FIT test shows any traces of occult blood, it does NOT necessarily mean that you have colon cancer, it just means that we should probably consider doing the colonoscopy.   Pros: easy to collect, cheap  Cons: not very specific, high false positive rate        Shingles Vaccine (SHINGRIX):        I would recommend that you consider " getting the Shingrix shingles vaccine.  The shingles vaccine is recommended for anyone over age 50.       The Shingrix vaccine is a series of 2 vaccines given 2-6 months apart.       The shingles vaccine does not stop you from getting shingles, but it decreases the intensity of the event, the duration of the event, and decreases the painful nerve condition that results       Based on the available data, the Shingrix vaccine has superior benefit and should be considered even if you have had the old Zostavax shinglesvaccine before.        Contact your insurance provider and ask them if either shingles vaccine is covered and is so, how much it will cost you.  Usually this will be cheaper to get in a pharmacy given by the pharmacist.      Regardless of the coverage, I would recommend that you consider the vaccine since shingles is very painful, (just ask anyone who has ever had it)      For Medicare insurance, the vaccine is cheaper to receive from a pharmacist in a pharmacy than for us to give you in the clinic.            COLONOSCOPY:     *  All patients over age 50 are recommended to have formal colon cancer screening (starting in the early 40's if there is a family history of colon cancer, 1 first degree relative or 2 second degree relatives)  *  I would recommend a colonoscopy at [facility] [phone]    for colon cancer.  I will place the order and they will contact you to arrange this at your convenience.    If you have not been contacted by them within a week, then you can contact them at [facility] [phone] (fax [phone])    *  Colonoscopy is the gold standard recommended procedure for colon cancer screening.    *  If the colonoscopy is normal and no family history of colon cancer, then repeat colonoscopy every 10 years.   *  Colonoscopy recommended every 5 years with any family history of colon cancer, regardless of the findings on your colonoscopy.  *  Colonoscopy may  "need to be done at shorter intervals if any pre-cancerous polyps are found.            5 GOALS TO PREVENT VASCULAR DISEASE:     1.  Aggressive blood pressure control, under 130/80 ideally.  Using medications if needed.    Your blood pressure is under good control    BP Readings from Last 4 Encounters:   10/21/20 134/72   10/15/19 110/78       2.  Aggressive LDL cholesterol (\"bad cholesterol\") lowering as indicated.    Your goal is an LDL under 130 for sure, preferably under 100.  (If you have diabetes or previous vascular disease, the the LDL goals would be under 100 for sure, preferably under 70.)    New guidelines identify four high-risk groups who could benefit from statins:   *people with pre-existing heart disease, such as those who have had a heart attack;   *people ages 40 to 75 who have diabetes of any type  *patients ages 40 to 75 with at least a 7.5% risk of developing cardiovascular disease over the next decade, according to a formula described in the guidelines  *patients with the sort of super-high cholesterol that sometimes runs in families, as evidenced by an LDL of 190 milligrams per deciliter or higher    Your cholesterol levels are well controlled.    Recent Labs   Lab Test 10/15/19  1129   CHOL 158   HDL 62   LDL 83   TRIG 63       3.  Aggressive diabetic prevention, screening and/or management.      You do not have diabetes as of the most recent blood tests.     4.  No smoking    5.  Consider daily preventative aspirin over age 50 if you have enough cardiac risk factors to place you at higher risk for the presence of vascular disease.    If you have any reason not to take aspirin such easy bruising or bleeding, stomach problems, other anticoagulant medications, or any other side effects, then you should not take Aspirin.      --Based on your current cardiac risk factor profile, you should take regular daily Aspirin 81 mg once per day.           Preventive Health Recommendations  Male Ages 50 - " "64    Yearly exam:             See your health care provider every year in order to  o   Review health changes.   o   Discuss preventive care.    o   Review your medicines if your doctor has prescribed any.     Have a cholesterol test every 5 years, or more frequently if you are at risk for high cholesterol/heart disease.     Have a diabetes test (fasting glucose) every three years. If you are at risk for diabetes, you should have this test more often.     Have a colonoscopy at age 50, or have a yearly FIT test (stool test). These exams will check for colon cancer.      Talk with your health care provider about whether or not a prostate cancer screening test (PSA) is right for you.    You should be tested each year for STDs (sexually transmitted diseases), if you re at risk.     Shots: Get a flu shot each year. Get a tetanus shot every 10 years.     Nutrition:    Eat at least 5 servings of fruits and vegetables daily.     Eat whole-grain bread, whole-wheat pasta and brown rice instead of white grains and rice.     Get adequate Calcium and Vitamin D.        --Good Grains:  Oats, brown rice, Quinoa (these do not raise the blood sugar as much)     --Bad grains:  Anything made from wheat or white rice     (because these raise the blood sugars significantly, and the possible gluten issue from wheat for some people).      --Proteins:  Aim for \"lean proteins\" including chicken, fish, seafood, pork, turkey, and eggs (in moderation); Eat red meat only occasionally      Lifestyle    Exercise for at least 150 minutes a week (30 minutes a day, 5 days a week). This will help you control your weight and prevent disease.     Limit alcohol to one drink per day.     No smoking.     Wear sunscreen to prevent skin cancer.     See your dentist every six months for an exam and cleaning.     See your eye doctor every 1 to 2 years.    "

## 2020-10-21 NOTE — PROGRESS NOTES
SUBJECTIVE:   CC: Edd Mcrae is an 65 year old male who presents for preventative health visit.       Patient has been advised of split billing requirements and indicates understanding: Yes  Healthy Habits:     Getting at least 3 servings of Calcium per day:  Yes    Bi-annual eye exam:  Yes    Dental care twice a year:  Yes    Sleep apnea or symptoms of sleep apnea:  None    Diet:  Regular (no restrictions)    Frequency of exercise:  2-3 days/week    Duration of exercise:  45-60 minutes    Taking medications regularly:  Yes    Medication side effects:  Not applicable    PHQ-2 Total Score: 0    Additional concerns today:  No      1.  Hyperlipidemia:  Has history of hyperlipidemia.    The patient is taking a medication for this.  Denies any significant side effects from his medication.      Latest labs reviewed:    Recent Labs   Lab Test 10/21/20  0930 10/15/19  1129   CHOL 153 158   HDL 60 62   LDL 77 83   TRIG 79 63        Lab Results   Component Value Date    AST 30 10/21/2020             Today's PHQ-2 Score:   PHQ-2 ( 1999 Pfizer) 10/20/2020   Q1: Little interest or pleasure in doing things 0   Q2: Feeling down, depressed or hopeless 0   PHQ-2 Score 0   Q1: Little interest or pleasure in doing things Not at all   Q2: Feeling down, depressed or hopeless Not at all   PHQ-2 Score 0       Abuse: Current or Past(Physical, Sexual or Emotional)- No  Do you feel safe in your environment? Yes    Have you ever done Advance Care Planning? (For example, a Health Directive, POLST, or a discussion with a medical provider or your loved ones about your wishes): Yes, patient states has an Advance Care Planning document and will bring a copy to the clinic.    Social History     Tobacco Use     Smoking status: Former Smoker     Packs/day: 0.00     Smokeless tobacco: Former User     Quit date: 1/1/2009     Tobacco comment: former smoker, 1/4-1/2 ppd off an on ages 20-50   Substance Use Topics     Alcohol use: Yes     Frequency: 4  or more times a week     Drinks per session: 1 or 2     Binge frequency: Never           AUDIT - Alcohol Use Disorders Identification Test - Reproduced from the World Health Organization Audit 2001 (Second Edition) 10/20/2020   1.  How often do you have a drink containing alcohol? 4 or more times a week   2.  How many drinks containing alcohol do you have on a typical day when you are drinking? 1 or 2   3.  How often do you have five or more drinks on one occasion? Less than monthly   4.  How often during the last year have you found that you were not able to stop drinking once you had started? Never   5.  How often during the last year have you failed to do what was normally expected of you because of drinking? Never   6.  How often during the last year have you needed a first drink in the morning to get yourself going after a heavy drinking session? Never   7.  How often during the last year have you had a feeling of guilt or remorse after drinking? Never   8.  How often during the last year have you been unable to remember what happened the night before because of your drinking? Never   9.  Have you or someone else been injured because of your drinking? No   10. Has a relative, friend, doctor or other health care worker been concerned about your drinking or suggested you cut down? No   TOTAL SCORE 5       Last PSA:   PSA   Date Value Ref Range Status   10/15/2019 1.26 0 - 4 ug/L Final     Comment:     Assay Method:  Chemiluminescence using Siemens Vista analyzer       Reviewed orders with patient. Reviewed health maintenance and updated orders accordingly - Yes      Reviewed and updated as needed this visit by clinical staff  Tobacco  Allergies  Meds   Med Hx   Fam Hx          Reviewed and updated as needed this visit by Provider      Med Hx   Fam Hx           I reviewed the information recorded in the patient's EPIC chart (including but not limited to medical history, surgical history, problem list,  "medication list, and allergy list) and updated information as needed.         Review of Systems  CONSTITUTIONAL: NEGATIVE for fever, chills, change in weight  INTEGUMENTARY/SKIN: NEGATIVE for worrisome rashes, moles or lesions  EYES: NEGATIVE for vision changes or irritation  ENT: NEGATIVE for ear, mouth and throat problems  RESP: NEGATIVE for significant cough or SOB  CV: NEGATIVE for chest pain, palpitations or peripheral edema  GI: NEGATIVE for nausea, abdominal pain, heartburn, or change in bowel habits   male: negative for dysuria, hematuria, decreased urinary stream, erectile dysfunction, urethral discharge  MUSCULOSKELETAL: NEGATIVE for significant arthralgias or myalgia  NEURO: NEGATIVE for weakness, dizziness or paresthesias  PSYCHIATRIC: NEGATIVE for changes in mood or affect    OBJECTIVE:   /72   Pulse 60   Temp 96.9  F (36.1  C) (Temporal)   Ht 1.753 m (5' 9\")   Wt 85.1 kg (187 lb 11.2 oz)   SpO2 99%   BMI 27.72 kg/m      Physical Exam  GENERAL alert and no distress  EYES:  Normal sclera,conjunctiva, EOMI  HENT: oral and posterior pharynx without lesions or erythema, facies symmetric  NECK: Neck supple. No LAD, without thyroidmegaly.  RESP: Clear to ausculation bilaterally without wheezes or crackles. Normal BS in all fields.  CV: RRR normal S1S2 without murmurs, rubs or gallops.  LYMPH: no cervical lymph adenopathy appreciated  MS: extremities- no gross deformities of the visible extremities noted,   EXT:  no lower extremity edema  PSYCH: Alert and oriented times 3; speech- coherent  SKIN:  No obvious significant skin lesions on visible portions of face     Diagnostic Test Results:  Labs reviewed in Epic    ASSESSMENT/PLAN:     (Z00.00) Routine general medical examination at a health care facility  (primary encounter diagnosis)  Comment: Discussed cardiac disease risk factor modification including screening, preventing, and treating hypertension, elevated lipids, diabetes, and smoking " cessation.    Discussed age appropriate cancer screening recommendations as dictated by age group and past medical history.    Recommended making better food choices as often as possible, including lower carb, lower fat, lower salt diet and moderation in any alcohol intake.    Recommended maintaining regular physical activity/exercise throughout their lifetime.  Recommended safety and injury prevention (i.e. seatbelt use, safety equipment like helmets when biking, etc).       Plan:     (E78.5) Hyperlipidemia LDL goal <130  Comment: This condition is currently controlled on the current medical regimen.  Continue current therapy.   Recheck labs   Plan: rosuvastatin (CRESTOR) 10 MG tablet, Lipid         panel reflex to direct LDL Fasting, AST, ALT,         CBC with platelets, Basic metabolic panel,         Lipid panel reflex to direct LDL Fasting,         Comprehensive metabolic panel, CBC with         platelets            (Z23) Need for influenza vaccination  Comment:   Plan: ADMIN 1st VACCINE, CANCELED: C RIV4 (FLUBLOK)         VACCINE RECOMBINANT DNA PRSRV ANTIBIO FREE, IM         [[id number]]            (Z12.11) Screening for colon cancer  Comment: Discussed current colon cancer screening recommendations.  Recommended colonoscopy as the gold standard for colon cancer screening.  Instructed them to check with their insurance coverage to see what is covered, and then referral given for colonoscopy.    If colonoscopy not covered or the patient does not want to do this study and the patient is average risk for colon cancer, then I recommended either the ColoGuard stool test every 3 years and the FIT test annually.  I explained that a positive test for either of these tests does not necessarily mean colon cancer, it just means that they will need a more advanced test like colonoscopy.      Plan: GASTROENTEROLOGY ADULT REF PROCEDURE ONLY            (Z12.5) Screening for prostate cancer  Comment: Discussed prostate cancer  "screening and PSA blood test.  Discussed that an elevated PSA blood test can be caused by things other than prostate cancer, including infection/inflammation, BPH, and recent sexual activity; and that elevated PSA blood test may require further investigation with a urologist   Plan: PSA, screen            (Z12.11) Screen for colon cancer  Comment:   Plan:     (Z13.6) CARDIOVASCULAR SCREENING; LDL GOAL LESS THAN 130  Comment: Discussed cardiac disease risk factors and cardiac disease risk factor modification.   Plan: Lipid panel reflex to direct LDL Fasting, AST,         ALT, CBC with platelets, Basic metabolic panel,        Lipid panel reflex to direct LDL Fasting,         Comprehensive metabolic panel, CBC with         platelets            (Z23) Need for vaccination  Comment:   Plan: EA ADD'L VACCINE               Patient has been advised of split billing requirements and indicates understanding: Yes  COUNSELING:   Reviewed preventive health counseling, as reflected in patient instructions       Regular exercise       Healthy diet/nutrition       Vision screening       Hearing screening       Immunizations    Vaccinated for: Influenza and Zoster             Colon cancer screening       Prostate cancer screening    Estimated body mass index is 27.72 kg/m  as calculated from the following:    Height as of this encounter: 1.753 m (5' 9\").    Weight as of this encounter: 85.1 kg (187 lb 11.2 oz).         He reports that he has quit smoking. He smoked 0.00 packs per day. He quit smokeless tobacco use about 11 years ago.      Counseling Resources:  ATP IV Guidelines  Pooled Cohorts Equation Calculator  FRAX Risk Assessment  ICSI Preventive Guidelines  Dietary Guidelines for Americans, 2010  USDA's MyPlate  ASA Prophylaxis  Lung CA Screening    Storm Tamayo MD  [facility]  "

## 2020-10-22 ENCOUNTER — NURSE TRIAGE (OUTPATIENT)
Dept: INTERNAL MEDICINE | Facility: CLINIC | Age: 65
End: 2020-10-22

## 2020-10-22 NOTE — TELEPHONE ENCOUNTER
"Patient calling. Had appt with Dr. Tamayo yesterday morning and received flu and shingles shots. Around 8 pm last night he developed some symptoms, but patient feels fine now. Thinks symptoms started around 8 pm that he noticed, though was busy before that. Late last night felt cold, freezing, wrapped up in blankets. But then was burning up in the middle of night. Thinks he is fine this morning. 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 He is wondering if he should get tested or if Dr. Tamayo feels this could be reaction to vaccines? No fever right now that he knows of. Did not check temperature at all but was \"roasting hot\" overnight. Says his temp always runs a degree lower. No cough, SOB, rash. Injection site looks fine. Please advise.   "

## 2020-10-22 NOTE — TELEPHONE ENCOUNTER
This is almost certainly from the vaccines he received.  These kinds of symptosm are very common and require no specific treatment other than supportive cares with tyleno, motrin, etc,   This does not sound like Covid 19.   As such, these symptoms should resolve fairly quickly.    No covid-19 testing.

## 2021-10-10 ENCOUNTER — HEALTH MAINTENANCE LETTER (OUTPATIENT)
Age: 66
End: 2021-10-10

## 2021-10-23 ASSESSMENT — ENCOUNTER SYMPTOMS
HEMATOCHEZIA: 0
EYE PAIN: 0
PARESTHESIAS: 0
JOINT SWELLING: 0
FREQUENCY: 1
PALPITATIONS: 0
HEADACHES: 0
MYALGIAS: 0
ARTHRALGIAS: 1
WEAKNESS: 0
HEARTBURN: 0
DYSURIA: 0
SORE THROAT: 0
ABDOMINAL PAIN: 0
CHILLS: 0
HEMATURIA: 0
FEVER: 0
CONSTIPATION: 0
DIZZINESS: 0
NERVOUS/ANXIOUS: 0
NAUSEA: 0
SHORTNESS OF BREATH: 0
COUGH: 0
DIARRHEA: 0

## 2021-10-23 ASSESSMENT — ACTIVITIES OF DAILY LIVING (ADL): CURRENT_FUNCTION: NO ASSISTANCE NEEDED

## 2021-10-26 ENCOUNTER — OFFICE VISIT (OUTPATIENT)
Dept: INTERNAL MEDICINE | Facility: CLINIC | Age: 66
End: 2021-10-26
Payer: COMMERCIAL

## 2021-10-26 VITALS
OXYGEN SATURATION: 100 % | BODY MASS INDEX: 28.07 KG/M2 | SYSTOLIC BLOOD PRESSURE: 128 MMHG | HEART RATE: 58 BPM | TEMPERATURE: 96 F | HEIGHT: 69 IN | DIASTOLIC BLOOD PRESSURE: 72 MMHG | WEIGHT: 189.5 LBS

## 2021-10-26 DIAGNOSIS — Z13.0 SCREENING, ANEMIA, DEFICIENCY, IRON: ICD-10-CM

## 2021-10-26 DIAGNOSIS — Z12.5 SCREENING FOR PROSTATE CANCER: ICD-10-CM

## 2021-10-26 DIAGNOSIS — Z13.6 CARDIOVASCULAR SCREENING; LDL GOAL LESS THAN 130: ICD-10-CM

## 2021-10-26 DIAGNOSIS — Z11.59 ENCOUNTER FOR HEPATITIS C SCREENING TEST FOR LOW RISK PATIENT: ICD-10-CM

## 2021-10-26 DIAGNOSIS — Z11.4 SCREENING FOR HIV WITHOUT PRESENCE OF RISK FACTORS: ICD-10-CM

## 2021-10-26 DIAGNOSIS — Z23 NEED FOR INFLUENZA VACCINATION: ICD-10-CM

## 2021-10-26 DIAGNOSIS — L91.8 SKIN TAG: ICD-10-CM

## 2021-10-26 DIAGNOSIS — B07.8 COMMON WART: ICD-10-CM

## 2021-10-26 DIAGNOSIS — Z00.00 ENCOUNTER FOR MEDICARE ANNUAL WELLNESS EXAM: Primary | ICD-10-CM

## 2021-10-26 DIAGNOSIS — E78.5 HYPERLIPIDEMIA LDL GOAL <130: ICD-10-CM

## 2021-10-26 LAB
ALT SERPL W P-5'-P-CCNC: 27 U/L (ref 0–70)
ANION GAP SERPL CALCULATED.3IONS-SCNC: 5 MMOL/L (ref 3–14)
AST SERPL W P-5'-P-CCNC: 26 U/L (ref 0–45)
BUN SERPL-MCNC: 18 MG/DL (ref 7–30)
CALCIUM SERPL-MCNC: 9.3 MG/DL (ref 8.5–10.1)
CHLORIDE BLD-SCNC: 103 MMOL/L (ref 94–109)
CHOLEST SERPL-MCNC: 139 MG/DL
CO2 SERPL-SCNC: 28 MMOL/L (ref 20–32)
CREAT SERPL-MCNC: 0.95 MG/DL (ref 0.66–1.25)
FASTING STATUS PATIENT QL REPORTED: YES
GFR SERPL CREATININE-BSD FRML MDRD: 84 ML/MIN/1.73M2
GLUCOSE BLD-MCNC: 100 MG/DL (ref 70–99)
HCV AB SERPL QL IA: NONREACTIVE
HDLC SERPL-MCNC: 54 MG/DL
HGB BLD-MCNC: 14 G/DL (ref 13.3–17.7)
HIV 1+2 AB+HIV1 P24 AG SERPL QL IA: NONREACTIVE
LDLC SERPL CALC-MCNC: 76 MG/DL
NONHDLC SERPL-MCNC: 85 MG/DL
POTASSIUM BLD-SCNC: 4.1 MMOL/L (ref 3.4–5.3)
PSA SERPL-MCNC: 1.11 UG/L (ref 0–4)
SODIUM SERPL-SCNC: 136 MMOL/L (ref 133–144)
TRIGL SERPL-MCNC: 46 MG/DL

## 2021-10-26 PROCEDURE — 2894A PR VOIDCORRECT: CPT | Mod: 25 | Performed by: INTERNAL MEDICINE

## 2021-10-26 PROCEDURE — G0008 ADMIN INFLUENZA VIRUS VAC: HCPCS | Performed by: INTERNAL MEDICINE

## 2021-10-26 PROCEDURE — 85018 HEMOGLOBIN: CPT | Performed by: INTERNAL MEDICINE

## 2021-10-26 PROCEDURE — 80048 BASIC METABOLIC PNL TOTAL CA: CPT | Performed by: INTERNAL MEDICINE

## 2021-10-26 PROCEDURE — 90662 IIV NO PRSV INCREASED AG IM: CPT | Performed by: INTERNAL MEDICINE

## 2021-10-26 PROCEDURE — 84460 ALANINE AMINO (ALT) (SGPT): CPT | Performed by: INTERNAL MEDICINE

## 2021-10-26 PROCEDURE — 36415 COLL VENOUS BLD VENIPUNCTURE: CPT | Performed by: INTERNAL MEDICINE

## 2021-10-26 PROCEDURE — 84450 TRANSFERASE (AST) (SGOT): CPT | Performed by: INTERNAL MEDICINE

## 2021-10-26 PROCEDURE — 86803 HEPATITIS C AB TEST: CPT | Performed by: INTERNAL MEDICINE

## 2021-10-26 PROCEDURE — 0004A COVID-19,PF,PFIZER (12+ YRS): CPT | Performed by: INTERNAL MEDICINE

## 2021-10-26 PROCEDURE — G0103 PSA SCREENING: HCPCS | Performed by: INTERNAL MEDICINE

## 2021-10-26 PROCEDURE — 17110 DESTRUCTION B9 LES UP TO 14: CPT | Performed by: INTERNAL MEDICINE

## 2021-10-26 PROCEDURE — 91300 COVID-19,PF,PFIZER (12+ YRS): CPT | Performed by: INTERNAL MEDICINE

## 2021-10-26 PROCEDURE — 87389 HIV-1 AG W/HIV-1&-2 AB AG IA: CPT | Performed by: INTERNAL MEDICINE

## 2021-10-26 PROCEDURE — 80061 LIPID PANEL: CPT | Performed by: INTERNAL MEDICINE

## 2021-10-26 RX ORDER — ROSUVASTATIN CALCIUM 10 MG/1
10 TABLET, COATED ORAL DAILY
Qty: 90 TABLET | Refills: 3 | Status: SHIPPED | OUTPATIENT
Start: 2021-10-26 | End: 2022-11-10

## 2021-10-26 ASSESSMENT — ACTIVITIES OF DAILY LIVING (ADL): CURRENT_FUNCTION: NO ASSISTANCE NEEDED

## 2021-10-26 ASSESSMENT — MIFFLIN-ST. JEOR: SCORE: 1634.95

## 2021-10-26 NOTE — PROGRESS NOTES
"    The patient was counseled and encouraged to consider modifying their diet and eating habits. He was provided with information on recommended healthy diet options.  He is at risk for falling and has been provided with information to reduce the risk of falling at home.  Answers for HPI/ROS submitted by the patient on 10/23/2021  In general, how would you rate your overall physical health?: excellent  Frequency of exercise:: 4-5 days/week  Do you usually eat at least 4 servings of fruit and vegetables a day, include whole grains & fiber, and avoid regularly eating high fat or \"junk\" foods? : No  Taking medications regularly:: Yes  Medication side effects:: None  Activities of Daily Living: no assistance needed  Home safety: lack of grab bars in the bathroom  Hearing Impairment:: no hearing concerns  In the past 6 months, have you been bothered by leaking of urine?: No  abdominal pain: No  Blood in stool: No  Blood in urine: No  chest pain: No  chills: No  congestion: No  constipation: No  cough: No  diarrhea: No  dizziness: No  ear pain: No  eye pain: No  nervous/anxious: No  fever: No  frequency: Yes  genital sores: No  headaches: No  hearing loss: No  heartburn: No  arthralgias: Yes  joint swelling: No  peripheral edema: No  mood changes: No  myalgias: No  nausea: No  dysuria: No  palpitations: No  Skin sensation changes: No  sore throat: No  urgency: No  rash: No  shortness of breath: No  visual disturbance: No  weakness: No  impotence: No  penile discharge: No  In general, how would you rate your overall mental or emotional health?: excellent  Additional concerns today:: Yes  Duration of exercise:: 15-30 minutes      "

## 2021-10-26 NOTE — PATIENT INSTRUCTIONS
"*  Continue all medications at the same doses.  Contact your usual pharmacy if you need refills.    *  Annual flu shot today    *  Pfizer Covid booster today    *  Get pneumonia vaccine ( pneumovax 23) at the pharmacy in a week from now.      *  Next colonoscopy due 2023 based on the normal result from 2013.     *  Return to see me in 1 year, sooner if needed.  Use [employer] or Call [phone] to schedule the appointment with me.       5 GOALS TO PREVENT VASCULAR DISEASE:     1.  Aggressive blood pressure control, under 130/80 ideally.  Using medications if needed.    Your blood pressure is under good control    BP Readings from Last 4 Encounters:   10/26/21 128/72   10/21/20 134/72   10/15/19 110/78       2.  Aggressive LDL cholesterol (\"bad cholesterol\") lowering as indicated.    Your goal is an LDL under 130 for sure, preferably under 100.  (If you have diabetes or previous vascular disease, the the LDL goals would be under 100 for sure, preferably under 70.)    New guidelines identify four high-risk groups who could benefit from statins:   *people with pre-existing heart disease, such as those who have had a heart attack;   *people ages 40 to 75 who have diabetes of any type  *patients ages 40 to 75 with at least a 7.5% risk of developing cardiovascular disease over the next decade, according to a formula described in the guidelines  *patients with the sort of super-high cholesterol that sometimes runs in families, as evidenced by an LDL of 190 milligrams per deciliter or higher    Your cholesterol levels are well controlled.    Recent Labs   Lab Test 10/21/20  0930 10/15/19  1129   CHOL 153 158   HDL 60 62   LDL 77 83   TRIG 79 63       3.  Aggressive diabetic prevention, screening and/or management.      You do not have diabetes as of the most recent blood tests.     4.  No smoking    5.  Consider daily preventative aspirin over age 50 if you have enough cardiac risk factors to place you at higher risk for the " presence of vascular disease.    If you have any reason not to take aspirin such easy bruising or bleeding, stomach problems, other anticoagulant medications, or any other side effects, then you should not take Aspirin.     --Based on your current cardiac disease risk profile and/or age over 75, you do NOT need to take daily preventative aspirin.      Preventive Health Recommendations:   Male Ages 65 and over    Yearly exam:             See your health care provider every year in order to  o   Review health changes.   o   Discuss preventive care.    o   Review your medicines if your doctor has prescribed any.    Talk with your health care provider about whether you should have a test to screen for prostate cancer (PSA).    Every 3 years, have a diabetes test (fasting glucose). If you are at risk for diabetes, you should have this test more often.    Every 5 years, have a cholesterol test. Have this test more often if you are at risk for high cholesterol or heart disease.     Every 10 years, have a colonoscopy. Or, have a yearly FIT test (stool test). These exams will check for colon cancer.    Talk to with your health care provider about screening for Abdominal Aortic Aneurysm if you have a family history of AAA or have a history of smoking.    Shots:     Get a flu shot each year.     Get a tetanus shot every 10 years.     Talk to your doctor about your pneumonia vaccines. There are now two you should receive - Pneumovax (PPSV 23) and Prevnar (PCV 13).     Talk to your doctor about a shingles vaccine.     Talk to your doctor about the hepatitis B vaccine.  Nutrition:     Eat at least 5 servings of fruits and vegetables each day.     Eat whole-grain bread, whole-wheat pasta and brown rice instead of white grains and rice.     Talk to your provider about Calcium and Vitamin D.      --Good Grains:  Oats, brown rice, Quinoa (these do not raise the blood sugar as much)     --Bad grains:  Anything made from wheat or white  "rice     (because these raise the blood sugars significantly, and the possible gluten issue from wheat for some people).      --Proteins:  Aim for \"lean proteins\" including chicken, fish, seafood, pork, turkey, and eggs (in moderation); Eat red meat only occasionally    Lifestyle    Exercise for at least 150 minutes a week (30 minutes a day, 5 days a week). This will help you control your weight and prevent disease.     Limit alcohol to one drink per day.     No smoking.     Wear sunscreen to prevent skin cancer.     See your dentist every six months for an exam and cleaning.     See your eye doctor every 1 to 2 years to screen for conditions such as glaucoma, macular degeneration, cataracts, etc           Patient Education   Personalized Prevention Plan  You are due for the preventive services outlined below.  Your care team is available to assist you in scheduling these services.  If you have already completed any of these items, please share that information with your care team to update in your medical record.  Health Maintenance Due   Topic Date Due     ANNUAL REVIEW OF HM ORDERS  Never done     HIV Screening  Never done     Hepatitis C Screening  Never done     Colorectal Cancer Screening  01/01/2021     FALL RISK ASSESSMENT  Never done     Pneumococcal Vaccine (1 of 1 - PPSV23) Never done     AORTIC ANEURYSM SCREENING (SYSTEM ASSIGNED)  Never done     Flu Vaccine (1) 09/01/2021       Understanding USDA MyPlate  The USDA has guidelines to help you make healthy food choices. These are called MyPlate. MyPlate shows the food groups that make up healthy meals using the image of a place setting. Before you eat, think about the healthiest choices for what to put on your plate or in your cup or bowl. To learn more about building a healthy plate, visit www.choosemyplate.gov.    The food groups    Fruits. Any fruit or 100% fruit juice counts as part of the Fruit Group. Fruits may be fresh, canned, frozen, or dried, and " may be whole, cut-up, or pureed. Make 1/2 of your plate fruits and vegetables.    Vegetables. Any vegetable or 100% vegetable juice counts as a member of the Vegetable Group. Vegetables may be fresh, frozen, canned, or dried. They can be served raw or cooked and may be whole, cut-up, or mashed. Make 1/2 of your plate fruits and vegetables.    Grains. All foods made from grains are part of the Grains Group. These include wheat, rice, oats, cornmeal, and barley. Grains are often used to make foods such as bread, pasta, oatmeal, cereal, tortillas, and grits. Grains should be no more than 1/4 of your plate. At least half of your grains should be whole grains.    Protein. This group includes meat, poultry, seafood, beans and peas, eggs, processed soy products (such as tofu), nuts (including nut butters), and seeds. Make protein choices no more than 1/4 of your plate. Meat and poultry choices should be lean or low fat.    Dairy. The Dairy Group includes all fluid milk products and foods made from milk that contain calcium, such as yogurt and cheese. (Foods that have little calcium, such as cream, butter, and cream cheese, are not part of this group.) Most dairy choices should be low-fat or fat-free.    Oils. Oils aren't a food group, but they do contain essential nutrients. However it's important to watch your intake of oils. These are fats that are liquid at room temperature. They include canola, corn, olive, soybean, vegetable, and sunflower oil. Foods that are mainly oil include mayonnaise, certain salad dressings, and soft margarines. You likely already get your daily oil allowance from the foods you eat.  Things to limit  Eating healthy also means limiting these things in your diet:       Salt (sodium). Many processed foods have a lot of sodium. To keep sodium intake down, eat fresh vegetables, meats, poultry, and seafood when possible. Purchase low-sodium, reduced-sodium, or no-salt-added food products at the store.  And don't add salt to your meals at home. Instead, season them with herbs and spices such as dill, oregano, cumin, and paprika. Or try adding flavor with lemon or lime zest and juice.    Saturated fat. Saturated fats are most often found in animal products such as beef, pork, and chicken. They are often solid at room temperature, such as butter. To reduce your saturated fat intake, choose leaner cuts of meat and poultry. And try healthier cooking methods such as grilling, broiling, roasting, or baking. For a simple lower-fat swap, use plain nonfat yogurt instead of mayonnaise when making potato salad or macaroni salad.    Added sugars. These are sugars added to foods. They are in foods such as ice cream, candy, soda, fruit drinks, sports drinks, energy drinks, cookies, pastries, jams, and syrups. Cut down on added sugars by sharing sweet treats with a family member or friend. You can also choose fruit for dessert, and drink water or other unsweetened beverages.     [employer] last reviewed this educational content on 6/1/2020 2000-2021 [employer]. All rights reserved. This information is not intended as a substitute for professional medical care. Always follow your healthcare professional's instructions.          Preventing Falls at Home  A person can fall for many reasons. Older adults may fall because reaction time slows down as we age. Your muscles and joints may get stiff, weak, or less flexible because of illness, medicines, or a physical condition.   Other health problems that make falls more likely include:     Arthritis    Dizziness or lightheadedness when you stand up (orthostatic hypotension)    History of a stroke    Dizziness    Anemia    Certain medicines taken for mental illness or to control blood pressure.    Problems with balance or gait    Bladder or urinary problems    History of falling    Changes in vision (vision impairment)    Changes in thinking skills and memory (cognitive  impairment)  Injuries from a fall can include serious injuries such as broken bones, dislocated joints, internal bleeding and cuts. Injuries like these can limit your independence.   Prevention tips  To help prevent falls and fall-related injuries, follow the tips below.    Floors  To make floors safer:     Put nonskid pads under area rugs.    Remove small rugs.    Replace worn floor coverings.    Tack carpets firmly to each step on carpeted stairs. Put nonskid strips on the edges of uncarpeted stairs.    Keep floors and stairs free of clutter and cords.    Arrange furniture so there are clear pathways.    Clean up any spills right away.  Bathrooms    To make bathrooms safer:     Install grab bars in the tub or shower.    Apply nonskid strips or put a nonskid rubber mat in the tub or shower.    Sit on a bath chair to bathe.    Use bathmats with nonskid backing.  Lighting  To improve visibility in your home:      Keep a flashlight in each room. Or put a lamp next to the bed within easy reach.    Put nightlights in the bedrooms, hallways, kitchen, and bathrooms.    Make sure all stairways have good lighting.    Take your time when going up and down stairs.    Put handrails on both sides of stairs and in walkways for more support. To prevent injury to your wrist or arm, don t use handrails to pull yourself up.    Install grab bars to pull yourself up.    Move or rearrange items that you use often. This will make them easier to find or reach.    Look at your home to find any safety hazards. Especially look at doorways, walkways, and the driveway. Remove or repair any safety problems that you find.  Other changes to make    Look around to find any safety hazards. Look closely at doorways, walkways, and the driveway. Remove or repair any safety problems that you find.    Wear shoes that fit well.    Take your time when going up and down stairs.    Put handrails on both sides of stairs and in walkways for more support. To  prevent injury to your wrist or arm, don t use handrails to pull yourself up.    Install grab bars wherever needed to pull yourself up.    Arrange items that you use often. This will make them easier to find or reach.    [employer] last reviewed this educational content on 3/1/2020    3328-2765 [employer]. All rights reserved. This information is not intended as a substitute for professional medical care. Always follow your healthcare professional's instructions.

## 2021-10-26 NOTE — PROGRESS NOTES
"SUBJECTIVE:   Edd Mcrae is a 66 year old male who presents for Preventive Visit.      Patient has been advised of split billing requirements and indicates understanding: Yes   Are you in the first 12 months of your Medicare coverage?  Yes. Pt got vision check at [facility].    Healthy Habits:     In general, how would you rate your overall health?  Excellent    Frequency of exercise:  4-5 days/week    Duration of exercise:  15-30 minutes    Do you usually eat at least 4 servings of fruit and vegetables a day, include whole grains    & fiber and avoid regularly eating high fat or \"junk\" foods?  No    Taking medications regularly:  Yes    Barriers to taking medications:  None    Medication side effects:  None    Ability to successfully perform activities of daily living:  No assistance needed    Home Safety:  Lack of grab bars in the bathroom    Hearing Impairment:  No hearing concerns    In the past 6 months, have you been bothered by leaking of urine?  No    In general, how would you rate your overall mental or emotional health?  Excellent      PHQ-2 Total Score: 0    Additional concerns today:  Yes    Do you feel safe in your environment? Yes    Have you ever done Advance Care Planning? (For example, a Health Directive, POLST, or a discussion with a medical provider or your loved ones about your wishes): No, advance care planning information given to patient to review.  Patient plans to discuss their wishes with loved ones or provider.         Fall risk  Fallen 2 or more times in the past year?: No  Any fall with injury in the past year?: Yes  click delete button to remove this line now  Cognitive Screening   1) Repeat 3 items (Leader, Season, Table)    2) Clock draw: NORMAL  3) 3 item recall: Recalls 3 objects  Results: NORMAL clock, 3 items recalled: COGNITIVE IMPAIRMENT LESS LIKELY    Mini-CogTM Copyright S Glenda. Licensed by the author for use in [facility]; reprinted with " permission (kiko@[email or web address]). All rights reserved.      Do you have sleep apnea, excessive snoring or daytime drowsiness?: no    Reviewed and updated as needed this visit by clinical staff  Tobacco  Allergies  Meds  Problems             Reviewed and updated as needed this visit by Provider   Allergies  Meds  Problems            Social History     Tobacco Use     Smoking status: Former Smoker     Packs/day: 0.00     Smokeless tobacco: Former User     Quit date: 1/1/2009     Tobacco comment: former smoker, 1/4-1/2 ppd off an on ages [age]   Substance Use Topics     Alcohol use: Yes         Alcohol Use 10/23/2021   Prescreen: >3 drinks/day or >7 drinks/week? Yes   Prescreen: >3 drinks/day or >7 drinks/week? -   AUDIT SCORE  4       1.  Hyperlipidemia:  Has history of hyperlipidemia.    The patient is taking a medication for this.  Denies any significant side effects from his medication.      Latest labs reviewed:    Recent Labs   Lab Test 10/26/21  0939 10/21/20  0930   CHOL 139 153   HDL 54 60   LDL 76 77   TRIG 46 79        Lab Results   Component Value Date    AST 26 10/26/2021    AST 30 10/21/2020         2.  Has a large skin tag on his back he would like removed if possible.    2.  Multiple warts on his hands that are spotted to typical over-the-counter remedies        Current providers sharing in care for this patient include:   Patient Care Team:  Storm Tamayo MD as PCP - General (Internal Medicine)  Storm Tamayo MD as Assigned PCP    The following health maintenance items are reviewed in Epic and correct as of today:  Health Maintenance Due   Topic Date Due     ANNUAL REVIEW OF  ORDERS  Never done     COLORECTAL CANCER SCREENING  01/01/2021     FALL RISK ASSESSMENT  Never done     Pneumococcal Vaccine: 65+ Years (1 of 1 - PPSV23) Never done     AORTIC ANEURYSM SCREENING (SYSTEM ASSIGNED)  Never done       **I reviewed the information recorded in the patient's EPIC chart (including  "but not limited to medical history, surgical history, family history, problem list, medication list, and allergy list) and updated the information as indicated based on the patients reported information.             Review of Systems  Constitutional, HEENT, cardiovascular, pulmonary, gi and gu systems are negative, except as otherwise noted.    OBJECTIVE:   /72   Pulse 58   Temp (!) 96  F (35.6  C) (Tympanic)   Ht 1.753 m (5' 9\")   Wt 86 kg (189 lb 8 oz)   SpO2 100%   BMI 27.98 kg/m   Estimated body mass index is 27.98 kg/m  as calculated from the following:    Height as of this encounter: 1.753 m (5' 9\").    Weight as of this encounter: 86 kg (189 lb 8 oz).  Physical Exam  GENERAL alert and no distress  EYES:  Normal sclera,conjunctiva, EOMI  HENT: oral and posterior pharynx without lesions or erythema, facies symmetric  NECK: Neck supple. No LAD, without thyroidmegaly.  RESP: Clear to ausculation bilaterally without wheezes or crackles. Normal BS in all fields.  CV: RRR normal S1S2 without murmurs, rubs or gallops.  LYMPH: no cervical lymph adenopathy appreciated  MS: extremities- no gross deformities of the visible extremities noted,   EXT:  no lower extremity edema  PSYCH: Alert and oriented times 3; speech- coherent  SKIN:  No obvious significant skin lesions on visible portions of face     Diagnostic Test Results:  Labs reviewed in Epic    Name : Liquid Nitrogen Cry-Ac Cryotherapy.  Indication : Skin tag (s)  Location(s) : Lower left back  Completed by : Storm Tamayo M.D.  Note : Discussed natural history of lesion and treatment options. Prior to treatment, we discussed inflammation, tenderness post-procedure, the healing process, and the risks of pain, infection, scarring, blistering, and hypo-/hyperpigmentation after healing. Explained that these lesions may grow back and may need additional treatment or re-treatment. The patient expressed a desire to proceed with cryotherapy.    The " lesion(s) was treated with liquid nitrogen Cry-Ac, five second freeze repeated three times with a pause to allow for the area to thaw.  Where applicable, I used inverted otoscope head to limit spray  If this lesion should recur, then it needs to be re-evaluated.  Patient tolerated the procedure well and left in good condition.  Total number of lesions treated : 1       Name : Liquid Nitrogen Cry-Ac Cryotherapy.  Indication : Common warts   Location(s) : Right hand   Completed by : Storm Tamayo M.D.  Note : Discussed natural history of lesion and treatment options. Prior to treatment, we discussed inflammation, tenderness post-procedure, the healing process, and the risks of pain, infection, scarring, blistering, and hypo-/hyperpigmentation after healing. Explained that these lesions may grow back and may need additional treatment or re-treatment. The patient expressed a desire to proceed with cryotherapy.    The lesion(s) was treated with liquid nitrogen Cry-Ac, five second freeze repeated three times with a pause to allow for the area to thaw.  Where applicable, I used inverted otoscope head to limit spray  If this lesion should recur, then it needs to be re-evaluated.  Patient tolerated the procedure well and left in good condition.  Total number of lesions treated : 6       ASSESSMENT / PLAN:     (Z00.00) Encounter for Medicare annual wellness exam  (primary encounter diagnosis)  Comment: Discussed cardiac disease risk factors and cardiac disease risk factor modification, including diabetes screening, blood pressure screening (and management if indicated), and cholesterol screening.   Reviewed immunzation guidelines, including pneumococcal vaccines, annual influenza, and shingles vaccines.   Discussed routine cancer screenings, including skin cancer, colon cancer screening for everyone until age 80, prostate cancer screening in men until age 75, mammogram and PAP/pelvic for women until age 75.    Recommended regular dentist visits to care for remaining teeth.   Recommended regular screening for vision and glaucoma.   Recommended safe driving and accident avoidance.   Plan:     (E78.5) Hyperlipidemia LDL goal <130  Comment: This condition is currently controlled on the current medical regimen.  Continue current therapy.   Discussed current lipid results, previous results (if available) current guidelines (NCEP) for treatment and goals for lipids.  Discussed lifestyle modification, dietary changes (low fat, low simple carb) and regular aerobic exercise.  Discussed the link between dysmetabolic syndrome and impaired glucose tolerance seen in certain patterns of lipids.  Briefly discussed medication used for lipid lowering, including the statins are their possible side effects of myalgias, rhabdomyolysis, and liver toxicity.   Plan: rosuvastatin (CRESTOR) 10 MG tablet, Lipid         panel reflex to direct LDL Fasting, AST, ALT,         Basic metabolic panel, Hemoglobin              (Z11.59) Encounter for hepatitis C screening test for low risk patient  Comment: One time screening test per CDC recommendations.   Plan: Hepatitis C Screen Reflex to HCV RNA Quant and         Genotype            (Z11.4) Screening for HIV without presence of risk factors  Comment: One time screening test per CDC recommendations.   Plan: HIV Antigen Antibody Combo            (Z12.5) Screening for prostate cancer  Comment: Discussed prostate cancer screening and PSA blood test.  Discussed that an elevated PSA blood test can be caused by things other than prostate cancer, including infection/inflammation, BPH, and recent sexual activity; and that elevated PSA blood test may require further investigation with a urologist   Plan: PSA, screen            (Z13.6) CARDIOVASCULAR SCREENING; LDL GOAL LESS THAN 130  Comment: Discussed cardiac disease risk factors and cardiac disease risk factor modification.    Plan: Lipid panel reflex to  "direct LDL Fasting, AST,         ALT, Basic metabolic panel, Hemoglobin            (Z13.0) Screening, anemia, deficiency, iron  Comment:   Plan: Hemoglobin            (Z23) Need for influenza vaccination  Comment:   Plan: INFLUENZA, QUAD, HIGH DOSE, PF, 65YR + (FLUZONE        HD)            (L91.8) Skin tag  Comment: Large skin tag treated with liquid nitrogen.  Discussed typical wound care afterwards  Plan:     (B07.8) Common wart  Comment: 6 warted treated on his hand with liquid nitrogen  Plan: DESTRUCT BENIGN LESION, UP TO 14               Patient has been advised of split billing requirements and indicates understanding: Yes  COUNSELING:  Reviewed preventive health counseling, as reflected in patient instructions       Regular exercise       Healthy diet/nutrition       Vision screening       Hearing screening       Dental care       Aspirin prophylaxis        Hepatitis C screening       HIV screening for high risk patient       Colon cancer screening       Prostate cancer screening    Estimated body mass index is 27.98 kg/m  as calculated from the following:    Height as of this encounter: 1.753 m (5' 9\").    Weight as of this encounter: 86 kg (189 lb 8 oz).        He reports that he has quit smoking. He smoked 0.00 packs per day. He quit smokeless tobacco use about 12 years ago.      Appropriate preventive services were discussed with this patient, including applicable screening as appropriate for cardiovascular disease, diabetes, osteopenia/osteoporosis, and glaucoma.  As appropriate for age/gender, discussed screening for colorectal cancer, prostate cancer, breast cancer, and cervical cancer. Checklist reviewing preventive services available has been given to the patient.    Reviewed patients plan of care and provided an AVS. The  for Edd meets the Care Plan requirement. This Care Plan has been established and reviewed with the .    Counseling Resources:  ATP IV Guidelines  Pooled Cohorts Equation " Calculator  Breast Cancer Risk Calculator  Breast Cancer: Medication to Reduce Risk  FRAX Risk Assessment  ICSI Preventive Guidelines  Dietary Guidelines for Americans, 2010  Fingo's MyPlate  ASA Prophylaxis  Lung CA Screening    Storm Tamayo MD  [facility]    Identified Health Risks:

## 2021-10-26 NOTE — Clinical Note
Please abstract the following data from this visit with this patient into the appropriate field in Epic:    Tests that can be patient reported without a hard copy:    Colonoscopy done on this date: 9/12/13 (approximately), by this group: [facility], results were negative.     Other Tests found in the patient's chart through Chart Review/Care Everywhere:    Colonoscopy done by this group [facility] on this date: 9/12/13    Note to Abstraction: If this section is blank, no results were found via Chart Review/Care Everywhere.

## 2021-12-06 ENCOUNTER — NURSE TRIAGE (OUTPATIENT)
Dept: NURSING | Facility: CLINIC | Age: 66
End: 2021-12-06
Payer: COMMERCIAL

## 2021-12-06 NOTE — TELEPHONE ENCOUNTER
"  TELEPHONE CALL -    Reason for call EXPOSURE to COVID19  This weekend with grand kids -that are POSITIVE for COVID19  Pt has no symptoms and has not been tested just yet.  Has a office visit this 12/08/21 - for \"frezzing warts\" with PCP Storm Tamayo MD   [facility]    COVID19 testing [facility] or others:  RN offer telemedicine trough [facility] for MD to evaluate and place an order for testing  RN gave alternative of having Patient tested at any Testing-site in the area.   Advised to look online for available opening this week, Pt should be tested on day 5 of exposure or after, or as soon as they present symptoms  Pt will look for testing sites.    Pt wants to know if he needs to rescheduled? Or can he bee seeing?  Pt is concern because it took him to long to have this appt.    Informed patient that RN will send a note to PCP/Provider s Care Team   Note sent to [facility]    No symptoms reported, no reason to TRIAGE patient for this nurse.  Patient s questions answered  Reminded we will be here 24/7 for any other questions or concerns.  Writer encouraged to continue to monitor concerns and to call us back as necessary.  Svetlana Oliver RN Nurse Triage Advisor 3:00 PM 12/6/2021  "

## 2021-12-06 NOTE — TELEPHONE ENCOUNTER
Per verbal from CS, as long as pt remains asymptomatic, he does NOT need to reschedule and can keep appt on 12/08/2021    LM informing pt of these notes- advised to reschedule if he were to start presenting with any symptoms    Padma Yost CMA

## 2021-12-08 ENCOUNTER — OFFICE VISIT (OUTPATIENT)
Dept: INTERNAL MEDICINE | Facility: CLINIC | Age: 66
End: 2021-12-08
Payer: COMMERCIAL

## 2021-12-08 VITALS
OXYGEN SATURATION: 99 % | WEIGHT: 192 LBS | TEMPERATURE: 98 F | DIASTOLIC BLOOD PRESSURE: 63 MMHG | HEART RATE: 65 BPM | HEIGHT: 69 IN | SYSTOLIC BLOOD PRESSURE: 138 MMHG | BODY MASS INDEX: 28.44 KG/M2 | RESPIRATION RATE: 18 BRPM

## 2021-12-08 DIAGNOSIS — B07.8 COMMON WART: Primary | ICD-10-CM

## 2021-12-08 PROCEDURE — 17110 DESTRUCTION B9 LES UP TO 14: CPT | Performed by: INTERNAL MEDICINE

## 2021-12-08 ASSESSMENT — MIFFLIN-ST. JEOR: SCORE: 1646.29

## 2021-12-08 NOTE — PROGRESS NOTES
"  Assessment & Plan     (B07.8) Common wart  (primary encounter diagnosis)  Comment: Discussed briefly the pathophysiology of warts and treatment options.    Typical wart treatments were discussed.   After discussion, liquid nitrogen was recommended  Liquid nitrogen was applied to each wart for 5 seconds, for 2-3 cycles .   8 warts were treated today.    Warned about risks of blistering, pain, pigment change, scarring, and incomplete resolution.  Advised patient to return if lesions do not completely resolve within 1-2 months.   Recommended that any treated area be kept clean and dry, watch for signs of infection, and to return for further treatments at regular intervals until the warts are resolved.     If the patient has no improvement with the second round of liquid nitrogen therapy, then recommend referral to dermatology for more aggressive interventions.  Referrals given in case he decides to pursue this.  Plan: DESTRUCT BENIGN LESION, UP TO 14, Adult         Dermatology Referral                        BMI:   Estimated body mass index is 28.35 kg/m  as calculated from the following:    Height as of this encounter: 1.753 m (5' 9\").    Weight as of this encounter: 87.1 kg (192 lb).           Return if symptoms worsen or fail to improve.    Storm Tamayo MD  [facility]    Zaina Cottrell is a 66 year old who presents for the following health issues     History of Present Illness           Follow Up   Wart treatment on bilateral hands  Multiple warts on the right fingers, one on the left hand he would like to have treated/frozen.    Treated for common warts 1 month ago.  Had complete resolution of some of the warts on his right hand.  The large right hand wart is noticeably smaller.    Review of Systems   Constitutional, HEENT, cardiovascular, pulmonary, gi and gu systems are negative, except as otherwise noted.      Objective    /63 (BP Location: Right arm, Cuff " "Size: Adult Regular)   Pulse 65   Temp 98  F (36.7  C) (Temporal)   Resp 18   Ht 1.753 m (5' 9\")   Wt 87.1 kg (192 lb)   SpO2 99%   BMI 28.35 kg/m    Body mass index is 28.35 kg/m .  Physical Exam     SKIN: Numerous common warts on the right hand and left hand.  Large wart on the right third knuckle, 2 small warts on the right index finger wart on the right fourth finger, warts also present in the right second and fourth fingers.    After discussing risks and benefits of treatment options for warts, we agreed to liquid nitrogen.    There is not enough callus to warrant any debridement  Liquid nitrogen applied onto each of the warts, three applications total per wart in a freeze-thaw cycle using an inverted otoscope head to limit spray.   8 warts were treated today (6 on right hand, 2 on left)  This was tolerated well by the pt.   Pt instructed to watch for increasing redness, drainage, and purulence and call the clinic if experienced. Some discomfort is to be expected. Pt encouraged to RTC in 2-4 weeks or as needed for ongoing treatment if needed.               "

## 2022-09-18 ENCOUNTER — HEALTH MAINTENANCE LETTER (OUTPATIENT)
Age: 67
End: 2022-09-18

## 2022-09-26 ENCOUNTER — LAB (OUTPATIENT)
Dept: URGENT CARE | Facility: URGENT CARE | Age: 67
End: 2022-09-26

## 2022-09-26 DIAGNOSIS — Z20.822 CLOSE EXPOSURE TO 2019 NOVEL CORONAVIRUS: ICD-10-CM

## 2022-09-26 LAB — SARS-COV-2 RNA RESP QL NAA+PROBE: NEGATIVE

## 2022-09-26 PROCEDURE — U0003 INFECTIOUS AGENT DETECTION BY NUCLEIC ACID (DNA OR RNA); SEVERE ACUTE RESPIRATORY SYNDROME CORONAVIRUS 2 (SARS-COV-2) (CORONAVIRUS DISEASE [COVID-19]), AMPLIFIED PROBE TECHNIQUE, MAKING USE OF HIGH THROUGHPUT TECHNOLOGIES AS DESCRIBED BY CMS-2020-01-R: HCPCS

## 2022-09-26 PROCEDURE — U0005 INFEC AGEN DETEC AMPLI PROBE: HCPCS

## 2022-10-19 ENCOUNTER — IMMUNIZATION (OUTPATIENT)
Dept: NURSING | Facility: CLINIC | Age: 67
End: 2022-10-19
Payer: COMMERCIAL

## 2022-10-19 PROCEDURE — 0124A COVID-19,PF,PFIZER BOOSTER BIVALENT: CPT

## 2022-10-19 PROCEDURE — 90662 IIV NO PRSV INCREASED AG IM: CPT

## 2022-10-19 PROCEDURE — G0008 ADMIN INFLUENZA VIRUS VAC: HCPCS | Mod: 59

## 2022-10-19 PROCEDURE — 91312 COVID-19,PF,PFIZER BOOSTER BIVALENT: CPT

## 2022-11-10 ENCOUNTER — OFFICE VISIT (OUTPATIENT)
Dept: INTERNAL MEDICINE | Facility: CLINIC | Age: 67
End: 2022-11-10
Payer: COMMERCIAL

## 2022-11-10 VITALS
BODY MASS INDEX: 27.22 KG/M2 | HEART RATE: 59 BPM | WEIGHT: 183.8 LBS | OXYGEN SATURATION: 99 % | HEIGHT: 69 IN | TEMPERATURE: 97.3 F | DIASTOLIC BLOOD PRESSURE: 70 MMHG | SYSTOLIC BLOOD PRESSURE: 130 MMHG

## 2022-11-10 DIAGNOSIS — Z12.5 SCREENING FOR PROSTATE CANCER: ICD-10-CM

## 2022-11-10 DIAGNOSIS — L98.9 SCALP LESION: ICD-10-CM

## 2022-11-10 DIAGNOSIS — E78.5 HYPERLIPIDEMIA LDL GOAL <130: ICD-10-CM

## 2022-11-10 DIAGNOSIS — Z23 NEED FOR VACCINATION FOR PNEUMOCOCCUS: ICD-10-CM

## 2022-11-10 DIAGNOSIS — Z13.6 CARDIOVASCULAR SCREENING; LDL GOAL LESS THAN 130: ICD-10-CM

## 2022-11-10 DIAGNOSIS — Z00.00 ENCOUNTER FOR MEDICARE ANNUAL WELLNESS EXAM: Primary | ICD-10-CM

## 2022-11-10 LAB
ALT SERPL W P-5'-P-CCNC: 30 U/L (ref 0–70)
ANION GAP SERPL CALCULATED.3IONS-SCNC: <1 MMOL/L (ref 3–14)
BUN SERPL-MCNC: 17 MG/DL (ref 7–30)
CALCIUM SERPL-MCNC: 9.5 MG/DL (ref 8.5–10.1)
CHLORIDE BLD-SCNC: 106 MMOL/L (ref 94–109)
CHOLEST SERPL-MCNC: 139 MG/DL
CO2 SERPL-SCNC: 31 MMOL/L (ref 20–32)
CREAT SERPL-MCNC: 0.97 MG/DL (ref 0.66–1.25)
FASTING STATUS PATIENT QL REPORTED: YES
GFR SERPL CREATININE-BSD FRML MDRD: 86 ML/MIN/1.73M2
GLUCOSE BLD-MCNC: 112 MG/DL (ref 70–99)
HDLC SERPL-MCNC: 54 MG/DL
HGB BLD-MCNC: 14.5 G/DL (ref 13.3–17.7)
LDLC SERPL CALC-MCNC: 69 MG/DL
NONHDLC SERPL-MCNC: 85 MG/DL
POTASSIUM BLD-SCNC: 5.1 MMOL/L (ref 3.4–5.3)
PSA SERPL-MCNC: 1 UG/L (ref 0–4)
SODIUM SERPL-SCNC: 137 MMOL/L (ref 133–144)
TRIGL SERPL-MCNC: 82 MG/DL

## 2022-11-10 PROCEDURE — G0103 PSA SCREENING: HCPCS | Performed by: INTERNAL MEDICINE

## 2022-11-10 PROCEDURE — 85018 HEMOGLOBIN: CPT | Performed by: INTERNAL MEDICINE

## 2022-11-10 PROCEDURE — 90677 PCV20 VACCINE IM: CPT | Performed by: INTERNAL MEDICINE

## 2022-11-10 PROCEDURE — 80061 LIPID PANEL: CPT | Performed by: INTERNAL MEDICINE

## 2022-11-10 PROCEDURE — 84460 ALANINE AMINO (ALT) (SGPT): CPT | Performed by: INTERNAL MEDICINE

## 2022-11-10 PROCEDURE — G0009 ADMIN PNEUMOCOCCAL VACCINE: HCPCS | Performed by: INTERNAL MEDICINE

## 2022-11-10 PROCEDURE — 99213 OFFICE O/P EST LOW 20 MIN: CPT | Mod: 25 | Performed by: INTERNAL MEDICINE

## 2022-11-10 PROCEDURE — G0438 PPPS, INITIAL VISIT: HCPCS | Performed by: INTERNAL MEDICINE

## 2022-11-10 PROCEDURE — 36415 COLL VENOUS BLD VENIPUNCTURE: CPT | Performed by: INTERNAL MEDICINE

## 2022-11-10 PROCEDURE — 80048 BASIC METABOLIC PNL TOTAL CA: CPT | Performed by: INTERNAL MEDICINE

## 2022-11-10 RX ORDER — ROSUVASTATIN CALCIUM 10 MG/1
10 TABLET, COATED ORAL DAILY
Qty: 90 TABLET | Refills: 3 | Status: SHIPPED | OUTPATIENT
Start: 2022-11-10 | End: 2023-11-21

## 2022-11-10 ASSESSMENT — ACTIVITIES OF DAILY LIVING (ADL): CURRENT_FUNCTION: NO ASSISTANCE NEEDED

## 2022-11-10 ASSESSMENT — ENCOUNTER SYMPTOMS
DYSURIA: 0
FEVER: 0
FREQUENCY: 0
HEMATURIA: 0
PARESTHESIAS: 0
COUGH: 0
JOINT SWELLING: 0
NERVOUS/ANXIOUS: 0
CONSTIPATION: 0
HEARTBURN: 0
WEAKNESS: 0
MYALGIAS: 0
PALPITATIONS: 0
SORE THROAT: 0
DIZZINESS: 0
ARTHRALGIAS: 0
DIARRHEA: 0
EYE PAIN: 0
HEADACHES: 0
HEMATOCHEZIA: 0
CHILLS: 0
NAUSEA: 0
ABDOMINAL PAIN: 0
SHORTNESS OF BREATH: 0

## 2022-11-10 NOTE — PROGRESS NOTES
"SUBJECTIVE:   Pat is a 67 year old who presents for Preventive Visit.      Patient has been advised of split billing requirements and indicates understanding: Yes  Are you in the first 12 months of your Medicare coverage?  No    Healthy Habits:     In general, how would you rate your overall health?  Good    Frequency of exercise:  4-5 days/week    Duration of exercise:  30-45 minutes    Do you usually eat at least 4 servings of fruit and vegetables a day, include whole grains    & fiber and avoid regularly eating high fat or \"junk\" foods?  No    Taking medications regularly:  Yes    Medication side effects:  None    Ability to successfully perform activities of daily living:  No assistance needed    Home Safety:  No safety concerns identified    Hearing Impairment:  No hearing concerns    In the past 6 months, have you been bothered by leaking of urine?  No    In general, how would you rate your overall mental or emotional health?  Good      PHQ-2 Total Score: 0    Additional concerns today:  No    Do you feel safe in your environment? Yes    Have you ever done Advance Care Planning? (For example, a Health Directive, POLST, or a discussion with a medical provider or your loved ones about your wishes): No, advance care planning information given to patient to review.  Patient declined advance care planning discussion at this time.      Fall risk  Fallen 2 or more times in the past year?: No  Any fall with injury in the past year?: No    Cognitive Screening   1) Repeat 3 items (Leader, Season, Table)    2) Clock draw: NORMAL  3) 3 item recall: Recalls 3 objects  Results: 3 items recalled: COGNITIVE IMPAIRMENT LESS LIKELY    Mini-CogTM Copyright WILMER Doshi. Licensed by the author for use in [facility]; reprinted with permission ([email or web address]). All rights reserved.      Do you have sleep apnea, excessive snoring or daytime drowsiness?: no    Reviewed and updated as needed this visit by clinical staff   " Tobacco  Allergies  Meds  Problems             Reviewed and updated as needed this visit by Provider    Allergies  Meds  Problems            Social History     Tobacco Use     Smoking status: Former     Packs/day: 0.00     Years: 0.00     Pack years: 0.00     Types: Cigarettes     Start date: 1978     Quit date: 2009     Years since quittin.8     Smokeless tobacco: Never     Tobacco comments:     former smoker, 1/-1/ ppd off an on ages [age]   Substance Use Topics     Alcohol use: Yes     If you drink alcohol do you typically have >3 drinks per day or >7 drinks per week? No    Alcohol Use 11/10/2022   Prescreen: >3 drinks/day or >7 drinks/week? No   Prescreen: >3 drinks/day or >7 drinks/week? -   AUDIT SCORE  -           1.  Hyperlipidemia:  Has history of hyperlipidemia.    The patient is taking a medication for this.  Denies any significant side effects from his medication.      Latest labs reviewed:    Recent Labs   Lab Test 10/26/21  0939 10/21/20  0930   CHOL 139 153   HDL 54 60   LDL 76 77   TRIG 46 79        Lab Results   Component Value Date    AST 26 10/26/2021    AST 30 10/21/2020           Current providers sharing in care for this patient include:   Patient Care Team:  Storm Tamayo MD as PCP - General (Internal Medicine)  Storm Tamayo MD as Assigned PCP    The following health maintenance items are reviewed in Epic and correct as of today:  Health Maintenance   Topic Date Due     LUNG CANCER SCREENING  Never done     AORTIC ANEURYSM SCREENING (SYSTEM ASSIGNED)  Never done     COLORECTAL CANCER SCREENING  2023     MEDICARE ANNUAL WELLNESS VISIT  11/10/2023     ANNUAL REVIEW OF HM ORDERS  11/10/2023     FALL RISK ASSESSMENT  11/10/2023     DTAP/TDAP/TD IMMUNIZATION (3 - Td or Tdap) 2026     LIPID  10/26/2026     ADVANCE CARE PLANNING  11/10/2027     HEPATITIS C SCREENING  Completed     PHQ-2 (once per calendar year)  Completed     INFLUENZA VACCINE   "Completed     Pneumococcal Vaccine: 65+ Years  Completed     ZOSTER IMMUNIZATION  Completed     COVID-19 Vaccine  Completed     IPV IMMUNIZATION  Aged Out     MENINGITIS IMMUNIZATION  Aged Out         **I reviewed the information recorded in the patient's EPIC chart (including but not limited to medical history, surgical history, family history, problem list, medication list, and allergy list) and updated the information as indicated based on the patients reported information.             Review of Systems   Constitutional: Negative for chills and fever.   HENT: Negative for congestion, ear pain, hearing loss and sore throat.    Eyes: Negative for pain and visual disturbance.   Respiratory: Negative for cough and shortness of breath.    Cardiovascular: Negative for chest pain, palpitations and peripheral edema.   Gastrointestinal: Negative for abdominal pain, constipation, diarrhea, heartburn, hematochezia and nausea.   Genitourinary: Negative for dysuria, frequency, genital sores, hematuria, impotence, penile discharge and urgency.   Musculoskeletal: Negative for arthralgias, joint swelling and myalgias.   Skin: Negative for rash.   Neurological: Negative for dizziness, weakness, headaches and paresthesias.   Psychiatric/Behavioral: Negative for mood changes. The patient is not nervous/anxious.      Constitutional, HEENT, cardiovascular, pulmonary, gi and gu systems are negative, except as otherwise noted.    OBJECTIVE:   /70   Pulse 59   Temp 97.3  F (36.3  C) (Tympanic)   Ht 1.753 m (5' 9\")   Wt 83.4 kg (183 lb 12.8 oz)   SpO2 99%   BMI 27.14 kg/m   Estimated body mass index is 27.14 kg/m  as calculated from the following:    Height as of this encounter: 1.753 m (5' 9\").    Weight as of this encounter: 83.4 kg (183 lb 12.8 oz).  Physical Exam  GENERAL alert and no distress  EYES:  Normal sclera,conjunctiva, EOMI  HENT: oral and posterior pharynx without lesions or erythema, facies symmetric  NECK: " Neck supple. No LAD, without thyroidmegaly.  RESP: Clear to ausculation bilaterally without wheezes or crackles. Normal BS in all fields.  CV: RRR normal S1S2 without murmurs, rubs or gallops.  LYMPH: no cervical lymph adenopathy appreciated  MS: extremities- no gross deformities of the visible extremities noted,   EXT:  no lower extremity edema  PSYCH: Alert and oriented times 3; speech- coherent  SKIN:  No obvious significant skin lesions on visible portions of face   Irregular shaped flat, fleshy colored raised lesion 1.3 cm on right anterior scalp vertex    Diagnostic Test Results:  Labs reviewed in Epic    ASSESSMENT / PLAN:     (Z00.00) Encounter for Medicare annual wellness exam  (primary encounter diagnosis)  Comment: Discussed cardiac disease risk factor modification including screening, preventing, and treating hypertension, elevated lipids, diabetes, and smoking cessation.    Discussed age appropriate cancer screening recommendations as dictated by age group and past medical history.    Recommended making better food choices as often as possible, including lower carb, lower fat, lower salt diet and moderation in any alcohol intake.    Recommended maintaining regular physical activity/exercise throughout their lifetime.  Recommended safety and injury prevention (i.e. seatbelt use, safety equipment like helmets when biking, etc).       Plan: REVIEW OF HEALTH MAINTENANCE PROTOCOL ORDERS            (E78.5) Hyperlipidemia LDL goal <130  Comment: This condition is currently controlled on the current medical regimen.  Continue current therapy.   Recheck labs, titrate as needed.   Discussed current lipid results, previous results (if available) current guidelines (NCEP) for treatment and goals for lipids.  Discussed lifestyle modification, dietary changes (low fat, low simple carb) and regular aerobic exercise.  Discussed the link between dysmetabolic syndrome and impaired glucose tolerance seen in certain  patterns of lipids.  Briefly discussed medication used for lipid lowering, including the statins are their possible side effects of myalgias, rhabdomyolysis, and liver toxicity.   Plan: REVIEW OF HEALTH MAINTENANCE PROTOCOL ORDERS,         rosuvastatin (CRESTOR) 10 MG tablet, ALT, Basic        metabolic panel, Hemoglobin, Lipid panel reflex        to direct LDL Fasting            (L98.9) Scalp lesion  Comment: atypical appearing lesion, probably benign, but should see Derm nonetheless  Plan: REVIEW OF HEALTH MAINTENANCE PROTOCOL ORDERS,         Adult Dermatology Referral            (Z13.6) CARDIOVASCULAR SCREENING; LDL GOAL LESS THAN 130  Comment: Discussed cardiac disease risk factors and cardiac disease risk factor modification.   Plan: REVIEW OF HEALTH MAINTENANCE PROTOCOL ORDERS,         ALT, Basic metabolic panel, Hemoglobin, Lipid         panel reflex to direct LDL Fasting            (Z23) Need for vaccination for pneumococcus  Comment:   Plan: REVIEW OF HEALTH MAINTENANCE PROTOCOL ORDERS,         Pneumococcal 20 Valent Conjugate (PCV20)            (Z12.5) Screening for prostate cancer  Comment: Discussed prostate cancer screening and PSA blood test.  Discussed that an elevated PSA blood test can be caused by things other than prostate cancer, including infection/inflammation, BPH, and recent sexual activity; and that elevated PSA blood test may require further investigation with a urologist   Plan: REVIEW OF HEALTH MAINTENANCE PROTOCOL ORDERS,         PSA, screen               Patient has been advised of split billing requirements and indicates understanding: Yes      COUNSELING:  Reviewed preventive health counseling, as reflected in patient instructions       Regular exercise       Healthy diet/nutrition       Vision screening       Hearing screening       Dental care       Bladder control       Fall risk prevention       Immunizations    Vaccinated for: Pneumococcal      otherwise all vaccein up to  "date           Colon cancer screening next due 2023       Prostate cancer screening    Estimated body mass index is 27.14 kg/m  as calculated from the following:    Height as of this encounter: 1.753 m (5' 9\").    Weight as of this encounter: 83.4 kg (183 lb 12.8 oz).        He reports that he quit smoking about 13 years ago. His smoking use included cigarettes. He started smoking about 44 years ago. He has never used smokeless tobacco.      Appropriate preventive services were discussed with this patient, including applicable screening as appropriate for cardiovascular disease, diabetes, osteopenia/osteoporosis, and glaucoma.  As appropriate for age/gender, discussed screening for colorectal cancer, prostate cancer, breast cancer, and cervical cancer. Checklist reviewing preventive services available has been given to the patient.    Reviewed patients plan of care and provided an AVS. The Basic Care Plan (routine screening as documented in Health Maintenance) for Edd meets the Care Plan requirement. This Care Plan has been established and reviewed with the Patient.    Counseling Resources:  ATP IV Guidelines  Pooled Cohorts Equation Calculator  Breast Cancer Risk Calculator  Breast Cancer: Medication to Reduce Risk  FRAX Risk Assessment  ICSI Preventive Guidelines  Dietary Guidelines for Americans, 2010  [employer]'s MyPlate  ASA Prophylaxis  Lung CA Screening    Storm Tamayo MD  [facility]    Identified Health Risks:  "

## 2022-11-10 NOTE — PROGRESS NOTES
"    The patient was counseled and encouraged to consider modifying their diet and eating habits. He was provided with information on recommended healthy diet options.        Answers for HPI/ROS submitted by the patient on 11/10/2022  In general, how would you rate your overall physical health?: good  Frequency of exercise:: 4-5 days/week  Do you usually eat at least 4 servings of fruit and vegetables a day, include whole grains & fiber, and avoid regularly eating high fat or \"junk\" foods? : No  Taking medications regularly:: Yes  Medication side effects:: None  Activities of Daily Living: no assistance needed  Home safety: no safety concerns identified  Hearing Impairment:: no hearing concerns  In the past 6 months, have you been bothered by leaking of urine?: No  abdominal pain: No  Blood in stool: No  Blood in urine: No  chest pain: No  chills: No  congestion: No  constipation: No  cough: No  diarrhea: No  dizziness: No  ear pain: No  eye pain: No  nervous/anxious: No  fever: No  frequency: No  genital sores: No  headaches: No  hearing loss: No  heartburn: No  arthralgias: No  joint swelling: No  peripheral edema: No  mood changes: No  myalgias: No  nausea: No  dysuria: No  palpitations: No  Skin sensation changes: No  sore throat: No  urgency: No  rash: No  shortness of breath: No  visual disturbance: No  weakness: No  impotence: No  penile discharge: No  In general, how would you rate your overall mental or emotional health?: good  Additional concerns today:: No  Duration of exercise:: 30-45 minutes      "

## 2022-11-10 NOTE — PATIENT INSTRUCTIONS
"   We are rechecking your labs.  I will let you know if the results indicate any changes in your therapy.  Unless you hear otherwise, continue all medications at the same doses.  Contact your usual pharmacy if you need refills.      Assuming labs are normal, Continue all medications at the same doses.  Contact your usual pharmacy if you need refills.      Dermatology referral to evaluate the scalp lesions  [facility]   [address]   [place] [address]   Phone: [phone]          Colonoscopy next due 2013     Return to see me in 1 year, sooner if needed.  Use Tube2Tone or Call [phone] to schedule the appointment with me.             5 GOALS TO PREVENT VASCULAR DISEASE:     1.  Aggressive blood pressure control, under 130/80 ideally.  Using medications if needed.    Your blood pressure is under good control    BP Readings from Last 4 Encounters:   11/10/22 130/70   12/08/21 138/63   10/26/21 128/72   10/21/20 134/72       2.  Aggressive LDL cholesterol (\"bad cholesterol\") lowering as indicated.    Your goal is an LDL under 130 for sure, preferably under 100.  (If you have diabetes or previous vascular disease, the the LDL goals would be under 100 for sure, preferably under 70.)    New guidelines identify four high-risk groups who could benefit from statins:   *people with pre-existing heart disease, such as those who have had a heart attack;   *people ages 40 to 75 who have diabetes of any type  *patients ages 40 to 75 with at least a 7.5% risk of developing cardiovascular disease over the next decade, according to a formula described in the guidelines  *patients with the sort of super-high cholesterol that sometimes runs in families, as evidenced by an LDL of 190 milligrams per deciliter or higher    Your cholesterol levels are well controlled.    Recent Labs   Lab Test 10/26/21  0939 10/21/20  0930   CHOL 139 153   HDL 54 60   LDL 76 77   TRIG 46 79       3.  Aggressive " diabetic prevention, screening and/or management.      You do not have diabetes as of the most recent blood tests.     4.  No smoking    5.  Consider daily preventative aspirin over age 50 if you have enough cardiac risk factors to place you at higher risk for the presence of vascular disease.    If you have any reason not to take aspirin such easy bruising or bleeding, stomach problems, other anticoagulant medications, or any other side effects, then you should not take Aspirin.     --Based on your current cardiac disease risk profile and/or age over 75, you do NOT need to take daily preventative aspirin.        Preventive Health Recommendations:   Male Ages 65 and over    Yearly exam:             See your health care provider every year in order to  o   Review health changes.   o   Discuss preventive care.    o   Review your medicines if your doctor has prescribed any.  Talk with your health care provider about whether you should have a test to screen for prostate cancer (PSA).  Every 3 years, have a diabetes test (fasting glucose). If you are at risk for diabetes, you should have this test more often.  Every 5 years, have a cholesterol test. Have this test more often if you are at risk for high cholesterol or heart disease.   Every 10 years, have a colonoscopy. Or, have a yearly FIT test (stool test). These exams will check for colon cancer.  Talk to with your health care provider about screening for Abdominal Aortic Aneurysm if you have a family history of AAA or have a history of smoking.    Shots:   Get a flu shot each year.   Get a tetanus shot every 10 years.   Talk to your doctor about your pneumonia vaccines. There are now two you should receive - Pneumovax (PPSV 23) and Prevnar (PCV 13).   Talk to your doctor about a shingles vaccine.   Talk to your doctor about the hepatitis B vaccine.  Nutrition:   Eat at least 5 servings of fruits and vegetables each day.   Eat whole-grain bread, whole-wheat pasta and  "brown rice instead of white grains and rice.   Talk to your provider about Calcium and Vitamin D.      --Good Grains:  Oats, brown rice, Quinoa (these do not raise the blood sugar as much)     --Bad grains:  Anything made from wheat or white rice     (because these raise the blood sugars significantly, and the possible gluten issue from wheat for some people).      --Proteins:  Aim for \"lean proteins\" including chicken, fish, seafood, pork, turkey, and eggs (in moderation); Eat red meat only occasionally    Abiel Tan:                      Lifestyle  Exercise for at least 150 minutes a week (30 minutes a day, 5 days a week). This will help you control your weight and prevent disease.   Limit alcohol to one drink per day.   No smoking.   Wear sunscreen to prevent skin cancer.   See your dentist every six months for an exam and cleaning.   See your eye doctor every 1 to 2 years to screen for conditions such as glaucoma, macular degeneration, cataracts, etc         Patient Education   Personalized Prevention Plan  You are due for the preventive services outlined below.  Your care team is available to assist you in scheduling these services.  If you have already completed any of these items, please share that information with your care team to update in your medical record.  Health Maintenance Due   Topic Date Due    LUNG CANCER SCREENING  Never done    AORTIC ANEURYSM SCREENING (SYSTEM ASSIGNED)  Never done    Annual Wellness Visit  10/26/2022    Pneumococcal Vaccine (2 - PCV) 11/12/2022       Understanding USDA MyPlate  The USDA has guidelines to help you make healthy food choices. These are called MyPlate. MyPlate shows the food groups that make up healthy meals using the image of a place setting. Before you eat, think about the healthiest choices for what to put on your plate or in your cup or bowl. To learn more about building a healthy plate, visit www.choosemyplate.gov.    The food groups  Fruits. Any fruit " or 100% fruit juice counts as part of the Fruit Group. Fruits may be fresh, canned, frozen, or dried, and may be whole, cut-up, or pureed. Make 1/2 of your plate fruits and vegetables.  Vegetables. Any vegetable or 100% vegetable juice counts as a member of the Vegetable Group. Vegetables may be fresh, frozen, canned, or dried. They can be served raw or cooked and may be whole, cut-up, or mashed. Make 1/2 of your plate fruits and vegetables.  Grains. All foods made from grains are part of the Grains Group. These include wheat, rice, oats, cornmeal, and barley. Grains are often used to make foods such as bread, pasta, oatmeal, cereal, tortillas, and grits. Grains should be no more than 1/4 of your plate. At least half of your grains should be whole grains.  Protein. This group includes meat, poultry, seafood, beans and peas, eggs, processed soy products (such as tofu), nuts (including nut butters), and seeds. Make protein choices no more than 1/4 of your plate. Meat and poultry choices should be lean or low fat.  Dairy. The Dairy Group includes all fluid milk products and foods made from milk that contain calcium, such as yogurt and cheese. (Foods that have little calcium, such as cream, butter, and cream cheese, are not part of this group.) Most dairy choices should be low-fat or fat-free.  Oils. Oils aren't a food group, but they do contain essential nutrients. However it's important to watch your intake of oils. These are fats that are liquid at room temperature. They include canola, corn, olive, soybean, vegetable, and sunflower oil. Foods that are mainly oil include mayonnaise, certain salad dressings, and soft margarines. You likely already get your daily oil allowance from the foods you eat.  Things to limit  Eating healthy also means limiting these things in your diet:     Salt (sodium). Many processed foods have a lot of sodium. To keep sodium intake down, eat fresh vegetables, meats, poultry, and seafood  when possible. Purchase low-sodium, reduced-sodium, or no-salt-added food products at the store. And don't add salt to your meals at home. Instead, season them with herbs and spices such as dill, oregano, cumin, and paprika. Or try adding flavor with lemon or lime zest and juice.  Saturated fat. Saturated fats are most often found in animal products such as beef, pork, and chicken. They are often solid at room temperature, such as butter. To reduce your saturated fat intake, choose leaner cuts of meat and poultry. And try healthier cooking methods such as grilling, broiling, roasting, or baking. For a simple lower-fat swap, use plain nonfat yogurt instead of mayonnaise when making potato salad or macaroni salad.  Added sugars. These are sugars added to foods. They are in foods such as ice cream, candy, soda, fruit drinks, sports drinks, energy drinks, cookies, pastries, jams, and syrups. Cut down on added sugars by sharing sweet treats with a family member or friend. You can also choose fruit for dessert, and drink water or other unsweetened beverages.     [employer] last reviewed this educational content on 6/1/2020 2000-2021 [employer]. All rights reserved. This information is not intended as a substitute for professional medical care. Always follow your healthcare professional's instructions.

## 2022-11-21 ENCOUNTER — E-VISIT (OUTPATIENT)
Dept: INTERNAL MEDICINE | Facility: CLINIC | Age: 67
End: 2022-11-21
Payer: COMMERCIAL

## 2022-11-21 DIAGNOSIS — J06.9 UPPER RESPIRATORY TRACT INFECTION, UNSPECIFIED TYPE: Primary | ICD-10-CM

## 2022-11-21 PROCEDURE — 99421 OL DIG E/M SVC 5-10 MIN: CPT | Performed by: INTERNAL MEDICINE

## 2022-11-21 RX ORDER — AZITHROMYCIN 250 MG/1
TABLET, FILM COATED ORAL
Qty: 6 TABLET | Refills: 0 | Status: SHIPPED | OUTPATIENT
Start: 2022-11-21 | End: 2022-11-26

## 2022-11-22 NOTE — PATIENT INSTRUCTIONS
"Based on just a description of your symptoms and without the benefit of a physical exam, I suspect that you most likely have a simple viral upper respiratory tract infection, much less likely a bacterial strep throat is possible, but not as likely in adults.      Antibiotic may not do much for you, but it could help cover that possibility of a possible bacterial element.       --Take Azithromycin, 2 tablets today, then one per day for the next 4 days.  (6 tablets over 5 days).   Prescription(s) sent electronically to your [facility] pharmacy.      If you continue to have bothersome symptoms, then you should be seen in the Urgent Care for an actual exam.      Most presentations like this will peak around the 4th day, then improve.      ADDITIONAL SUPPORTIVE MEASURES:  Tylenol for fevers or headaches;     *  Motrin/Advil for any fevers, body/muscle aches.     *  Cough suppressant dextromethorphan, i.e. Delsym (or any cough medication with a \"DM\"), follow directions on bottle    *  Mucinex extended release, one or two tablets twice per day for the next 5-7 days.  This helps loosen the secretions to they can be passed more easily out of the body.     *  Be sure to drink lots of fluids.  If the appetite and intake of food is way down, then at leat try to eat soup.  Dehydration is the thing that causes people to end up in the hospital with viral infections and the flu.     *  For sore throat:  Try lozenges (Cepostat, Cepacol, hard candies, Zinc lozenges, etc)    *  If you have thick secretions:  Mucinex extended release twice per day on a regular basis for the next few days, then twice per day as needed.  OK to take the regular Mucinex, you may just have to take it 2-3 times per day.      *  If you have dry nasal passages or frequent bloody noses during the winter time:  Saline nasal spray as often as needed for dry nose.     *  For thick sinus secretions, consider using a \"Sinus Rinse Kit\" or Netti Pot For help rinsing out " sinus cavities if there is a lot of debris from the sinuses    *  If you have a lot of congestion and/or runny noses:  OK to try decongestants as needed (e.g. pseudoephedrine or phenylephrine).  Be sure to take the lowest dose needed.  Take decongestants from only ONE source.  It is possible to inadvertantly take more than the recommended amounts if you take decongestants from multiple products (i.e. Do NOT take Mucinex-D and Claritin-D together)    *  If you have been told to NOT take decongestants or if you cannot tolerate decongestants due to effect on blood pressure, sleep or heart rate:  Try Coricidin HBP or Chlortrimeton (chlorpheniramine).  These can have a similar effect as some decongestants but will not affect your heart rate or blood pressure.      *  If you have SEVERE nasal congestion:  Affrin nasal spray as needed for severe nasal congestion (especially before the airplane trip), but do not use for more than one week.      *  For nighttime congestion, consider taking your favorite nighttime multi symptoms cold reliever such as Nyquil, Vicks Formula 44, Tylenol multi symptoms nighttime cold reliever, etc.      *  Call the clinic if any changes in the symptoms such as worsening fevers, or the amount and quality of the sputum changes, or if you have any major difficulties breathing, or the symptoms fail to clear for a few weeks.    *  Most upper respiratory infection will clear 1-2 weeks, but it is not uncommon for post viral coughs to last for several weeks, even rarely the entire winter.

## 2023-06-26 ENCOUNTER — MYC MEDICAL ADVICE (OUTPATIENT)
Dept: INTERNAL MEDICINE | Facility: CLINIC | Age: 68
End: 2023-06-26
Payer: COMMERCIAL

## 2023-06-26 DIAGNOSIS — Z13.71 SCREENING FOR GENETIC DISEASE CARRIER STATUS: Primary | ICD-10-CM

## 2023-07-18 ENCOUNTER — IMMUNIZATION (OUTPATIENT)
Dept: NURSING | Facility: CLINIC | Age: 68
End: 2023-07-18
Payer: COMMERCIAL

## 2023-07-18 PROCEDURE — 0124A COVID-19 BIVALENT 12+ (PFIZER): CPT

## 2023-07-18 PROCEDURE — 91312 COVID-19 BIVALENT 12+ (PFIZER): CPT

## 2023-11-20 ASSESSMENT — ENCOUNTER SYMPTOMS
NERVOUS/ANXIOUS: 0
COUGH: 0
CHILLS: 0
SHORTNESS OF BREATH: 0
PARESTHESIAS: 0
FREQUENCY: 0
WEAKNESS: 0
EYE PAIN: 0
DYSURIA: 0
DIZZINESS: 0
NAUSEA: 0
ARTHRALGIAS: 0
JOINT SWELLING: 0
CONSTIPATION: 0
HEADACHES: 0
DIARRHEA: 0
HEMATOCHEZIA: 0
FEVER: 0
PALPITATIONS: 0
HEARTBURN: 0
SORE THROAT: 0
HEMATURIA: 0
MYALGIAS: 0
ABDOMINAL PAIN: 0

## 2023-11-20 ASSESSMENT — ACTIVITIES OF DAILY LIVING (ADL): CURRENT_FUNCTION: NO ASSISTANCE NEEDED

## 2023-11-21 ENCOUNTER — OFFICE VISIT (OUTPATIENT)
Dept: INTERNAL MEDICINE | Facility: CLINIC | Age: 68
End: 2023-11-21
Payer: COMMERCIAL

## 2023-11-21 VITALS
TEMPERATURE: 97.6 F | HEART RATE: 57 BPM | WEIGHT: 186.3 LBS | OXYGEN SATURATION: 99 % | BODY MASS INDEX: 27.59 KG/M2 | HEIGHT: 69 IN | SYSTOLIC BLOOD PRESSURE: 134 MMHG | DIASTOLIC BLOOD PRESSURE: 74 MMHG

## 2023-11-21 DIAGNOSIS — L60.8 TOENAIL DEFORMITY: ICD-10-CM

## 2023-11-21 DIAGNOSIS — Z12.5 SCREENING FOR PROSTATE CANCER: ICD-10-CM

## 2023-11-21 DIAGNOSIS — Z23 NEED FOR COVID-19 VACCINE: ICD-10-CM

## 2023-11-21 DIAGNOSIS — Z23 NEED FOR INFLUENZA VACCINATION: ICD-10-CM

## 2023-11-21 DIAGNOSIS — Z00.00 ENCOUNTER FOR MEDICARE ANNUAL WELLNESS EXAM: Primary | ICD-10-CM

## 2023-11-21 DIAGNOSIS — E78.5 HYPERLIPIDEMIA LDL GOAL <130: ICD-10-CM

## 2023-11-21 DIAGNOSIS — Z12.11 SCREEN FOR COLON CANCER: ICD-10-CM

## 2023-11-21 PROBLEM — N40.0 BENIGN PROSTATIC HYPERPLASIA: Status: ACTIVE | Noted: 2023-11-21

## 2023-11-21 LAB
ALT SERPL W P-5'-P-CCNC: 27 U/L (ref 0–70)
ANION GAP SERPL CALCULATED.3IONS-SCNC: 8 MMOL/L (ref 7–15)
BUN SERPL-MCNC: 13 MG/DL (ref 8–23)
CALCIUM SERPL-MCNC: 9.5 MG/DL (ref 8.8–10.2)
CHLORIDE SERPL-SCNC: 103 MMOL/L (ref 98–107)
CHOLEST SERPL-MCNC: 139 MG/DL
CREAT SERPL-MCNC: 0.97 MG/DL (ref 0.67–1.17)
DEPRECATED HCO3 PLAS-SCNC: 29 MMOL/L (ref 22–29)
EGFRCR SERPLBLD CKD-EPI 2021: 86 ML/MIN/1.73M2
GLUCOSE SERPL-MCNC: 98 MG/DL (ref 70–99)
HDLC SERPL-MCNC: 67 MG/DL
HGB BLD-MCNC: 14.7 G/DL (ref 13.3–17.7)
LDLC SERPL CALC-MCNC: 59 MG/DL
NONHDLC SERPL-MCNC: 72 MG/DL
POTASSIUM SERPL-SCNC: 4.6 MMOL/L (ref 3.4–5.3)
PSA SERPL DL<=0.01 NG/ML-MCNC: 1.73 NG/ML (ref 0–4.5)
SODIUM SERPL-SCNC: 140 MMOL/L (ref 135–145)
TRIGL SERPL-MCNC: 64 MG/DL

## 2023-11-21 PROCEDURE — 80048 BASIC METABOLIC PNL TOTAL CA: CPT | Performed by: INTERNAL MEDICINE

## 2023-11-21 PROCEDURE — 85018 HEMOGLOBIN: CPT | Performed by: INTERNAL MEDICINE

## 2023-11-21 PROCEDURE — 36415 COLL VENOUS BLD VENIPUNCTURE: CPT | Performed by: INTERNAL MEDICINE

## 2023-11-21 PROCEDURE — G0439 PPPS, SUBSEQ VISIT: HCPCS | Performed by: INTERNAL MEDICINE

## 2023-11-21 PROCEDURE — 80061 LIPID PANEL: CPT | Performed by: INTERNAL MEDICINE

## 2023-11-21 PROCEDURE — 84460 ALANINE AMINO (ALT) (SGPT): CPT | Performed by: INTERNAL MEDICINE

## 2023-11-21 PROCEDURE — 99213 OFFICE O/P EST LOW 20 MIN: CPT | Mod: 25 | Performed by: INTERNAL MEDICINE

## 2023-11-21 PROCEDURE — G0103 PSA SCREENING: HCPCS | Performed by: INTERNAL MEDICINE

## 2023-11-21 RX ORDER — ROSUVASTATIN CALCIUM 10 MG/1
10 TABLET, COATED ORAL DAILY
Qty: 90 TABLET | Refills: 3 | Status: SHIPPED | OUTPATIENT
Start: 2023-11-21

## 2023-11-21 ASSESSMENT — ENCOUNTER SYMPTOMS
NAUSEA: 0
PALPITATIONS: 0
HEMATURIA: 0
HEMATOCHEZIA: 0
FREQUENCY: 0
DYSURIA: 0
ARTHRALGIAS: 0
JOINT SWELLING: 0
EYE PAIN: 0
CHILLS: 0
DIZZINESS: 0
COUGH: 0
SORE THROAT: 0
PARESTHESIAS: 0
NERVOUS/ANXIOUS: 0
ABDOMINAL PAIN: 0
FEVER: 0
WEAKNESS: 0
SHORTNESS OF BREATH: 0
HEADACHES: 0
MYALGIAS: 0
HEARTBURN: 0
DIARRHEA: 0
CONSTIPATION: 0

## 2023-11-21 ASSESSMENT — ACTIVITIES OF DAILY LIVING (ADL): CURRENT_FUNCTION: NO ASSISTANCE NEEDED

## 2023-11-21 ASSESSMENT — PAIN SCALES - GENERAL: PAINLEVEL: NO PAIN (0)

## 2023-11-21 NOTE — PROGRESS NOTES
"SUBJECTIVE:   Ronit is a 68 year old, presenting for the following:  Medicare Visit        11/21/2023     8:17 AM   Additional Questions   Roomed by Angeles CRUZ CMA       Are you in the first 12 months of your Medicare coverage?  No    Healthy Habits:     In general, how would you rate your overall health?  Excellent    Frequency of exercise:  4-5 days/week    Duration of exercise:  30-45 minutes    Do you usually eat at least 4 servings of fruit and vegetables a day, include whole grains    & fiber and avoid regularly eating high fat or \"junk\" foods?  No    Taking medications regularly:  Yes    Barriers to taking medications:  None    Medication side effects:  Not applicable    Ability to successfully perform activities of daily living:  No assistance needed    Home Safety:  No safety concerns identified    Hearing Impairment:  No hearing concerns    In the past 6 months, have you been bothered by leaking of urine?  No    In general, how would you rate your overall mental or emotional health?  Excellent    Additional concerns today:  No      Today's PHQ-2 Score:       11/20/2023     6:19 PM   PHQ-2 ( 1999 [employer])   Q1: Little interest or pleasure in doing things 0   Q2: Feeling down, depressed or hopeless 0   PHQ-2 Score 0   Q1: Little interest or pleasure in doing things Not at all   Q2: Feeling down, depressed or hopeless Not at all   PHQ-2 Score 0       Have you ever done Advance Care Planning? (For example, a Health Directive, POLST, or a discussion with a medical provider or your loved ones about your wishes): No, advance care planning information given to patient to review.  Patient plans to discuss their wishes with loved ones or provider.         Fall risk  Fallen 2 or more times in the past year?: No  Any fall with injury in the past year?: No    Cognitive Screening   1) Repeat 3 items (Leader, Season, Table)    2) Clock draw: NORMAL  3) 3 item recall: Recalls 3 objects  Results: 3 items recalled: COGNITIVE " IMPAIRMENT LESS LIKELY    Mini-CogTM Copyright WILMER Doshi. Licensed by the author for use in [facility]; reprinted with permission (kiko@.[email or web address]). All rights reserved.      Do you have sleep apnea, excessive snoring or daytime drowsiness? : no    Reviewed and updated as needed this visit by clinical staff   Tobacco  Allergies  Meds  Problems    Fam Hx          Reviewed and updated as needed this visit by Provider    Allergies  Meds  Problems    Fam Hx         Social History     Tobacco Use     Smoking status: Former     Packs/day: 0.00     Years: 0.00     Additional pack years: 0.00     Total pack years: 0.00     Types: Cigarettes     Start date: 1978     Quit date: 2009     Years since quittin.8     Smokeless tobacco: Never     Tobacco comments:     former smoker, - ppd off an on ages [age]   Substance Use Topics     Alcohol use: Yes         2023     6:19 PM   Alcohol Use   Prescreen: >3 drinks/day or >7 drinks/week? Yes   AUDIT SCORE  5         2023     6:19 PM   AUDIT - Alcohol Use Disorders Identification Test - Reproduced from the World Health Organization Audit 2001 (Second Edition)   1.  How often do you have a drink containing alcohol? 4 or more times a week   2.  How many drinks containing alcohol do you have on a typical day when you are drinking? 1 or 2   3.  How often do you have five or more drinks on one occasion? Less than monthly   4.  How often during the last year have you found that you were not able to stop drinking once you had started? Never   5.  How often during the last year have you failed to do what was normally expected of you because of drinking? Never   6.  How often during the last year have you needed a first drink in the morning to get yourself going after a heavy drinking session? Never   7.  How often during the last year have you had a feeling of guilt or remorse after drinking? Never   8.  How often during the last year have you  been unable to remember what happened the night before because of your drinking? Never   9.  Have you or someone else been injured because of your drinking? No   10. Has a relative, friend, doctor or other health care worker been concerned about your drinking or suggested you cut down? No   TOTAL SCORE 5     Do you have a current opioid prescription? No  Do you use any other controlled substances or medications that are not prescribed by a provider? Alcohol      1.)  Hyperlipidemia:  Has history of hyperlipidemia.    The patient is taking a medication for this.  Denies any significant side effects from his medication.      Latest labs reviewed:    Recent Labs   Lab Test 11/10/22  0833 10/26/21  0939   CHOL 139 139   HDL 54 54   LDL 69 76   TRIG 82 46        Lab Results   Component Value Date    AST 26 10/26/2021    AST 30 10/21/2020           2.  history of abnormal first toenails for last 3 years, the medial margins appear slightl thickened, and cracking at times.  No pain.  He feels that the apperance has not changed much in past 3 years.   Was a former runner running on regular basis, although not as [facility] now.       Current providers sharing in care for this patient include:   Patient Care Team:  Storm Tamayo MD as PCP - General (Internal Medicine)  Storm Tamayo MD as Assigned PCP    The following health maintenance items are reviewed in Epic and correct as of today:  Health Maintenance   Topic Date Due     LUNG CANCER SCREENING  Never done     IPV IMMUNIZATION (2 of 3 - Adult catch-up series) 08/03/2006     RSV VACCINE (Pregnancy & 60+) (1 - 1-dose 60+ series) Never done     AORTIC ANEURYSM SCREENING (SYSTEM ASSIGNED)  Never done     INFLUENZA VACCINE (1) 09/01/2023     COLORECTAL CANCER SCREENING  09/12/2023     MEDICARE ANNUAL WELLNESS VISIT  11/21/2024     ANNUAL REVIEW OF HM ORDERS  11/21/2024     FALL RISK ASSESSMENT  11/21/2024     DTAP/TDAP/TD IMMUNIZATION (3 - Td or Tdap)  "08/29/2026     LIPID  11/10/2027     ADVANCE CARE PLANNING  11/21/2028     HEPATITIS C SCREENING  Completed     PHQ-2 (once per calendar year)  Completed     Pneumococcal Vaccine: 65+ Years  Completed     ZOSTER IMMUNIZATION  Completed     COVID-19 Vaccine  Completed     HPV IMMUNIZATION  Aged Out     MENINGITIS IMMUNIZATION  Aged Out     RSV MONOCLONAL ANTIBODY  Aged Out       **I reviewed the information recorded in the patient's EPIC chart (including but not limited to medical history, surgical history, family history, problem list, medication list, and allergy list) and updated the information as indicated based on the patients reported information.           Review of Systems   Constitutional:  Negative for chills and fever.   HENT:  Negative for congestion, ear pain, hearing loss and sore throat.    Eyes:  Negative for pain and visual disturbance.   Respiratory:  Negative for cough and shortness of breath.    Cardiovascular:  Negative for chest pain, palpitations and peripheral edema.   Gastrointestinal:  Negative for abdominal pain, constipation, diarrhea, heartburn, hematochezia and nausea.   Genitourinary:  Negative for dysuria, frequency, genital sores, hematuria, impotence, penile discharge and urgency.   Musculoskeletal:  Negative for arthralgias, joint swelling and myalgias.   Skin:  Negative for rash.   Neurological:  Negative for dizziness, weakness, headaches and paresthesias.   Psychiatric/Behavioral:  Negative for mood changes. The patient is not nervous/anxious.      Constitutional, HEENT, cardiovascular, pulmonary, gi and gu systems are negative, except as otherwise noted.    OBJECTIVE:   /74   Pulse 57   Temp 97.6  F (36.4  C) (Oral)   Ht 1.74 m (5' 8.5\")   Wt 84.5 kg (186 lb 4.8 oz)   SpO2 99%   BMI 27.91 kg/m   Estimated body mass index is 27.91 kg/m  as calculated from the following:    Height as of this encounter: 1.74 m (5' 8.5\").    Weight as of this encounter: 84.5 kg (186 lb " 4.8 oz).  Physical Exam  GENERAL alert and no distress  EYES:  Normal sclera,conjunctiva, EOMI  HENT: oral and posterior pharynx without lesions or erythema, facies symmetric  NECK: Neck supple. No LAD, without thyroidmegaly.  RESP: Clear to ausculation bilaterally without wheezes or crackles. Normal BS in all fields.  CV: RRR normal S1S2 without murmurs, rubs or gallops.  LYMPH: no cervical lymph adenopathy appreciated  MS: extremities- no gross deformities of the visible extremities noted,   EXT:  no lower extremity edema  PSYCH: Alert and oriented times 3; speech- coherent  SKIN:  No obvious significant skin lesions on visible portions of face  FEET:  first toenail on both feet show mild hypertrophy on the medial portion of the nail.  Good pedal pulses.         ASSESSMENT / PLAN:     (Z00.00) Encounter for Medicare annual wellness exam  (primary encounter diagnosis)  Comment: Discussed cardiac disease risk factors and cardiac disease risk factor modification, including diabetes screening, blood pressure screening (and management if indicated), and cholesterol screening.   Reviewed immunzation guidelines, including pneumococcal vaccines, annual influenza, and shingles vaccines.   Discussed routine cancer screenings, including skin cancer, colon cancer screening for everyone until age 80, prostate cancer screening in men until age 75, mammogram and PAP/pelvic for women until age 75.   Recommended regular dentist visits to care for remaining teeth.   Recommended regular screening for vision and glaucoma.   Recommended safe driving and accident avoidance.   Plan: REVIEW OF HEALTH MAINTENANCE PROTOCOL ORDERS            (Z12.11) Screen for colon cancer  Comment: Discussed current colon cancer screening recommendations.    Colonoscopy as the gold standard for colon cancer screening as this gives opportunity to identify and remove precancerous polyps.  We can send a referral for this procedure as needed.     If  colonoscopy not covered or the patient does not want to do this study and the patient is average risk for colon cancer, then I recommended either the ColoGuard stool test every 3 years or the FIT test annually.  I explained that a positive test for either of these tests does not necessarily mean colon cancer, it just means that they will need a more advanced test like colonoscopy.    Plan: Colonoscopy Screening  Referral,         REVIEW OF HEALTH MAINTENANCE PROTOCOL ORDERS            (Z23) Need for influenza vaccination  Comment:   Plan: REVIEW OF HEALTH MAINTENANCE PROTOCOL ORDERS            (Z23) Need for COVID-19 vaccine  Comment:   Plan: REVIEW OF HEALTH MAINTENANCE PROTOCOL ORDERS            (E78.5) Hyperlipidemia LDL goal <130  Comment: This condition is currently controlled on the current medical regimen.  Continue current therapy.   Plan: REVIEW OF HEALTH MAINTENANCE PROTOCOL ORDERS,         rosuvastatin (CRESTOR) 10 MG tablet, Lipid         panel reflex to direct LDL Fasting, Basic         metabolic panel, Hemoglobin, ALT            (Z12.5) Screening for prostate cancer  Comment: Discussed prostate cancer screening and PSA blood test.  Discussed that an elevated PSA blood test can be caused by things other than prostate cancer, including infection/inflammation, BPH, and recent sexual activity; and that elevated PSA blood test may require further investigation with a urologist   Plan: REVIEW OF HEALTH MAINTENANCE PROTOCOL ORDERS,         PSA, screen            (L60.8) Toenail deformity  Comment: based on the apperance and history, suspect this is more chronci toenail trauam from running rather than onychomycosis, especially since only the medial portion of the nail is affected and changes in apopearnce over 3 years.   Discussed taking a 90 day course of oral lamisil (holding the statin), but he felt continued observation is OK.   Plan:            Patient has been advised of split billing  requirements and indicates understanding: Yes      COUNSELING:  Reviewed preventive health counseling, as reflected in patient instructions       Regular exercise       Healthy diet/nutrition       Vision screening       Hearing screening       Dental care       Bladder control       Fall risk prevention       Immunizations  Vaccinated for: Covid-19 and Influenza    RSV vaccines are now available.  The will help provide protection against respiratory syncytial virus (RSV), a common upper respiratory virus that is known to cause severe inflammation in the airways.  This vaccine is recommended for adults over age 60, especially those with high risk conditions and/or chronic respiratory illnesses such as asthma or COPD.  This vaccine is available at most pharmacies and clinics.    If you are on Medicare insurance, get this vaccine from a pharmacist in a pharmacy for the best cost and to confirm coverage, it will be less expensive to get this there rather than from our clinic.            Colon cancer screening       Prostate cancer screening        He reports that he quit smoking about 14 years ago. His smoking use included cigarettes. He started smoking about 45 years ago. He has never used smokeless tobacco.      Appropriate preventive services were discussed with this patient, including applicable screening as appropriate for fall prevention, nutrition, physical activity, Tobacco-use cessation, weight loss and cognition.  Checklist reviewing preventive services available has been given to the patient.    Reviewed patients plan of care and provided an AVS. The  for Edd meets the Care Plan requirement. This Care Plan has been established and reviewed with the .          Storm Tamayo MD  [facility]    Identified Health Risks:

## 2023-11-21 NOTE — PATIENT INSTRUCTIONS
" We are rechecking your labs.  I will let you know if the results indicate any changes in your therapy.  Unless you hear otherwise, continue all medications at the same doses.  Contact your usual pharmacy if you need refills.     Assuming your labs look good, then continue all medications at the same doses.  Contact your usual pharmacy if you need refills.      Continue all medications at the same doses.  Contact your usual pharmacy if you need refills.      Return to see me in 1 year, sooner if needed.  Please get fasting labs done at the [facility] or any other [facility] lab 1-2 days before this appointment (schedule a \"lab appointment\" for this).  If you get the labs done at another [facility], make arrangements with them directly.  The orders will be in place.  Eat nothing for at least 8 hours prior to having these labs drawn.  Use [employer] or Call [phone] to schedule the appointment with me and lab appointment.       5 GOALS TO PREVENT VASCULAR DISEASE:     1.  Aggressive blood pressure control, under 130/80 ideally.  Using medications if needed.    Your blood pressure is under good control    BP Readings from Last 4 Encounters:   11/21/23 134/74   11/10/22 130/70   12/08/21 138/63   10/26/21 128/72       2.  Aggressive LDL cholesterol (\"bad cholesterol\") lowering as indicated.    Your goal is an LDL under 130 for sure, preferably under 100.  (If you have diabetes or previous vascular disease, the the LDL goals would be under 100 for sure, preferably under 70.)    New guidelines identify four high-risk groups who could benefit from statins:   *people with pre-existing heart disease, such as those who have had a heart attack;   *people ages 40 to 75 who have diabetes of any type  *patients ages 40 to 75 with at least a 7.5% risk of developing cardiovascular disease over the next decade, according to a formula described in the guidelines  *patients with the sort of super-high " cholesterol that sometimes runs in families, as evidenced by an LDL of 190 milligrams per deciliter or higher    Your cholesterol levels are well controlled.    Recent Labs   Lab Test 11/10/22  0833 10/26/21  0939   CHOL 139 139   HDL 54 54   LDL 69 76   TRIG 82 46       3.  Aggressive diabetic prevention, screening and/or management.      You do not have diabetes as of the most recent blood tests.     4.  No smoking    5.  Consider daily preventative aspirin over age 50 if you have enough cardiac risk factors to place you at higher risk for the presence of vascular disease.    If you have any reason not to take aspirin such easy bruising or bleeding, stomach problems, other anticoagulant medications, or any other side effects, then you should not take Aspirin.     --Based on your current cardiac disease risk profile and/or age over 75, you do NOT need to take daily preventative aspirin.        Preventive Health Recommendations:   Male Ages 65 - 75    Yearly exam:             See your health care provider every year in order to  o   Review health changes.   o   Discuss preventive care.    o   Review your medicines if your doctor has prescribed any.  Talk with your health care provider about whether you should have a test to screen for prostate cancer (PSA).  Every 3 years, have a diabetes test (fasting glucose). If you are at risk for diabetes, you should have this test more often.  At least Every 5 years, have a cholesterol test. Have this test more often if you have medical conditions that place you at higher risk for high cholesterol or heart disease.   Regular colon cancer screening starting age 45 with either a colonoscopy, or stool test (either Cologuard every 3 years or yearly FIT stool test from ).  The intervals for colon cancer screening will be determined by family history and prior colon cancer screening results.   Routine prostate cancer screening with a PSA blood test every 1-2 years.   While the PSA  blood test is not a direct cancer screening blood test, it detects inflammation within in the prostate, which could indicate possible cancer.  If the test is abnormal, you do not necessarily have prostate cancer, but would need further evaluation.    Talk to with your health care provider about screening for Abdominal Aortic Aneurysm if you have a family history of AAA or have a history of smoking.    Shots:   Get a flu shot each year.   Covid vaccines are now recommended annually.  Get the most updated Covid vaccine when it becomes available, consider getting this at the same time as the annual influenza vaccine.   Get a tetanus shot every 10 years.  Everyone over 65 should make sure to receive pneumonia vaccines to prevent the most common type of bacterial pneumonia: Pneumococcal pneumonia. There are now two you should receive - Pneumovax (PPSV 23) and Prevnar (PCV 20)  Strongly consider the Shingrix shingles vaccine to give you the best chance of avoiding future shingles infection (as many as 1 and 3 adults over age 50 may develop this condition in their lifetime).    If you have Medicare insurance, investigate the cost and coverage for Shingrix shingles vaccines with a pharmacist at a pharmacy.  They can tell you the coverage and cost and then give it to you if the price is acceptable.    Medicare sometimes does not cover these Shingrix shingles vaccines, and with Medicare insurance it is usually cheaper to receive this shingles vaccine from a pharmacist in a pharmacy rather than in our clinic.    At this time, you only need the 2 Shingrix vaccines and then you are done.     Nutrition:   Eat at least 5 servings of fruits and vegetables each day.   Eat whole-grain bread, whole-wheat pasta and brown rice instead of white grains and rice.   Talk to your provider about Calcium and Vitamin D.      --Good Grains:  Oats, brown rice, Quinoa (these do not raise the blood sugar as much)     --Bad grains:  Anything made  "from wheat or white rice     (because these raise the blood sugars significantly, and the possible gluten issue from wheat for some people).      --Proteins:  Aim for \"lean proteins\" including chicken, fish, seafood, pork, turkey, and eggs (in moderation); Eat red meat only occasionally    Lifestyle  Exercise for at least 150 minutes a week (30 minutes a day, 5 days a week). This will help you control your weight and prevent disease.   Limit alcohol to one drink per day.   No smoking.   Wear sunscreen to prevent skin cancer.   See your dentist every six months for an exam and cleaning.   See your eye doctor every 1 to 2 years to screen for conditions such as glaucoma, macular degeneration, cataracts, etc       Patient Education   Personalized Prevention Plan  You are due for the preventive services outlined below.  Your care team is available to assist you in scheduling these services.  If you have already completed any of these items, please share that information with your care team to update in your medical record.  Health Maintenance Due   Topic Date Due    LUNG CANCER SCREENING  Never done    Polio Vaccine (2 of 3 - Adult catch-up series) 08/03/2006    RSV VACCINE (Pregnancy & 60+) (1 - 1-dose 60+ series) Never done    AORTIC ANEURYSM SCREENING (SYSTEM ASSIGNED)  Never done    Flu Vaccine (1) 09/01/2023    Colorectal Cancer Screening  09/12/2023    ANNUAL REVIEW OF HM ORDERS  11/10/2023    Annual Wellness Visit  11/10/2023     Learning About Dietary Guidelines  What are the Dietary Guidelines for Americans?     Dietary Guidelines for Americans provide tips for eating well and staying healthy. This helps reduce the risk for long-term (chronic) diseases.  These guidelines recommend that you:  Eat and drink the right amount for you. The [place] government's food guide is called MyPlate. It can help you make your own well-balanced eating plan.  Try to balance your eating with your activity. This helps you stay at a " "healthy weight.  Drink alcohol in moderation, if at all.  Limit foods high in salt, saturated fat, trans fat, and added sugar.  These guidelines are from the U.S. Department of Agriculture and the U.S. Department of Health and Human Services. They are updated every 5 years.  What is MyPlate?  MyPlate is the U.[place] government's food guide. It can help you make your own well-balanced eating plan. A balanced eating plan means that you eat enough, but not too much, and that your food gives you the nutrients you need to stay healthy.  MyPlate focuses on eating plenty of whole grains, fruits, and vegetables, and on limiting fat and sugar. It is available online at www.ChooseMyPlate.gov.  How can you get started?  If you're trying to eat healthier, you can slowly change your eating habits over time. You don't have to make big changes all at once. Start by adding one or two healthy foods to your meals each day.  Grains  Choose whole-grain breads and cereals and whole-wheat pasta and whole-grain crackers.  Vegetables  Eat a variety of vegetables every day. They have lots of nutrients and are part of a heart-healthy diet.  Fruits  Eat a variety of fruits every day. Fruits contain lots of nutrients. Choose fresh fruit instead of fruit juice.  Protein foods  Choose fish and lean poultry more often. Eat red meat and fried meats less often. Dried beans, tofu, and nuts are also good sources of protein.  Dairy  Choose low-fat or fat-free products from this food group. If you have problems digesting milk, try eating cheese or yogurt instead.  Fats and oils  Limit fats and oils if you're trying to cut calories. Choose healthy fats when you cook. These include canola oil and olive oil.  Where can you learn more?  Go to https://www.healthwise.net/patiented  Enter D676 in the search box to learn more about \"Learning About Dietary Guidelines.\"  Current as of: February 28, 2023               Content Version: 13.8    2040-1050 Healthwise, " Incorporated.   Care instructions adapted under license by your healthcare professional. If you have questions about a medical condition or this instruction, always ask your healthcare professional. Healthwise, Incorporated disclaims any warranty or liability for your use of this information.

## 2023-11-21 NOTE — PROGRESS NOTES
"The patient was counseled and encouraged to consider modifying their diet and eating habits. He was provided with information on recommended healthy diet options.      Answers submitted by the patient for this visit:  Annual Preventive Visit (Submitted on 11/20/2023)  Chief Complaint: Annual Exam:  In general, how would you rate your overall physical health?: excellent  Frequency of exercise:: 4-5 days/week  Do you usually eat at least 4 servings of fruit and vegetables a day, include whole grains & fiber, and avoid regularly eating high fat or \"junk\" foods? : No  Taking medications regularly:: Yes  Medication side effects:: Not applicable  Activities of Daily Living: no assistance needed  Home safety: no safety concerns identified  Hearing Impairment:: no hearing concerns  In the past 6 months, have you been bothered by leaking of urine?: No  abdominal pain: No  Blood in stool: No  Blood in urine: No  chest pain: No  chills: No  congestion: No  constipation: No  cough: No  diarrhea: No  dizziness: No  ear pain: No  eye pain: No  nervous/anxious: No  fever: No  frequency: No  genital sores: No  headaches: No  hearing loss: No  heartburn: No  arthralgias: No  joint swelling: No  peripheral edema: No  mood changes: No  myalgias: No  nausea: No  dysuria: No  palpitations: No  Skin sensation changes: No  sore throat: No  urgency: No  rash: No  shortness of breath: No  visual disturbance: No  weakness: No  impotence: No  penile discharge: No  In general, how would you rate your overall mental or emotional health?: excellent  Additional concerns today:: No  Exercise outside of work (Submitted on 11/20/2023)  Chief Complaint: Annual Exam:  Duration of exercise:: 30-45 minutes    "

## 2023-12-01 ENCOUNTER — PATIENT OUTREACH (OUTPATIENT)
Dept: GASTROENTEROLOGY | Facility: CLINIC | Age: 68
End: 2023-12-01
Payer: COMMERCIAL

## 2024-01-16 ENCOUNTER — TRANSFERRED RECORDS (OUTPATIENT)
Dept: HEALTH INFORMATION MANAGEMENT | Facility: CLINIC | Age: 69
End: 2024-01-16
Payer: COMMERCIAL

## 2024-03-26 ENCOUNTER — E-VISIT (OUTPATIENT)
Dept: INTERNAL MEDICINE | Facility: CLINIC | Age: 69
End: 2024-03-26
Payer: COMMERCIAL

## 2024-03-26 DIAGNOSIS — N52.9 ERECTILE DYSFUNCTION, UNSPECIFIED ERECTILE DYSFUNCTION TYPE: Primary | ICD-10-CM

## 2024-03-26 PROCEDURE — 99421 OL DIG E/M SVC 5-10 MIN: CPT | Performed by: INTERNAL MEDICINE

## 2024-04-05 RX ORDER — SILDENAFIL 50 MG/1
50-100 TABLET, FILM COATED ORAL DAILY PRN
Qty: 30 TABLET | Refills: 5 | Status: SHIPPED | OUTPATIENT
Start: 2024-04-05

## 2024-07-01 ENCOUNTER — MYC MEDICAL ADVICE (OUTPATIENT)
Dept: INTERNAL MEDICINE | Facility: CLINIC | Age: 69
End: 2024-07-01

## 2024-07-01 DIAGNOSIS — Z13.71 SCREENING FOR GENETIC DISEASE CARRIER STATUS: Primary | ICD-10-CM

## 2024-07-08 ENCOUNTER — TELEPHONE (OUTPATIENT)
Dept: NEUROLOGY | Facility: CLINIC | Age: 69
End: 2024-07-08
Payer: COMMERCIAL

## 2024-07-08 NOTE — PROGRESS NOTES
GENETIC COUNSELING-Neurology  Patient referred for fam hx of Brooks disease (HD). He indicates that his mother was 'mildly' affected and that his sister is affected. He believes that he does not have any clinical symptoms at this time and the primary motivation for testing is that his daughter is interested in determining whether or not she is at risk.  I scheduled a visit with myself and Dr. Vincent for 8/16 and with Dr. Vale for 8/15.    Ad Lester MS, [facility] [place]  Certified Genetic Counselor

## 2024-07-16 DIAGNOSIS — Z82.0 FAMILY HISTORY OF HUNTINGTON'S DISEASE: Primary | ICD-10-CM

## 2024-07-16 PROBLEM — Z86.0100 HISTORY OF COLONIC POLYPS: Status: ACTIVE | Noted: 2024-01-16

## 2024-07-16 PROBLEM — K57.30 DIVERTICULAR DISEASE OF LARGE INTESTINE: Status: ACTIVE | Noted: 2024-01-16

## 2024-07-16 PROBLEM — K63.5 POLYP OF COLON: Status: ACTIVE | Noted: 2024-01-16

## 2024-07-16 PROBLEM — D12.3 BENIGN NEOPLASM OF TRANSVERSE COLON: Status: ACTIVE | Noted: 2024-01-18

## 2024-07-16 PROCEDURE — 99207 PR NO CHARGE LOS: CPT | Performed by: PSYCHIATRY & NEUROLOGY

## 2024-07-17 NOTE — PROGRESS NOTES
Chart review only    [address] [facility] [address]  Mobile Phone  [phone]  Email  [email or web address]    Edna lo daughter    Haylie cuellar spouse    scheduled per MB     Assessment:  Family history of HD  Sister Nunu with HD born 1953    GI/Constipation/GERD   Colonic polyps  Diverticular disease    ENDO/Lipid/DM/Bone density/Thyroid  Lipid disorder  Impaired fasting glucose  Vision/Dry Eyes/Cataracts/Glaucoma/Macular   Suspect glaucoma  Nuclear sclerosis of both eyes      Medications                    Psyllium         Rosuvastatin crestor 10mg        Sildenafil viagra 50mg                                                                                                                                          Plan:            Olu Vincent MD     ______________________________________    Last visit date and details:             ______________________________________      Patient was asked about 14 Review of systems including changes in vision (dry eyes, double vision), hearing, heart, lungs, musculoskeletal, depression, anxiety, snoring, RBD, insomnia, urinary frequency, urinary urgency, constipation, swallowing problems, hematological, ID, allergies, skin problems: seborrhea, endocrinological: thyroid, diabetes, cholesterol; balance, weight changes, and other neurological problems and these were not significant at this time except for   Patient Active Problem List   Diagnosis    Hyperlipidemia LDL goal <130    Glaucoma suspect, bilateral    Nuclear sclerosis of both eyes    Dyslipidemia    Benign prostatic hyperplasia    Adenomatous colon polyp    IFG (impaired fasting glucose)    Benign neoplasm of transverse colon    Diverticular disease of large intestine    History of colonic polyps    Polyp of colon        No Known Allergies  Past Surgical History:   Procedure Laterality Date    COLONOSCOPY  2013 July    expecting confirmation PN    NO HISTORY OF SURGERY       Past Medical  History:   Diagnosis Date    Hyperlipidemia LDL goal <130 2018         Social History     Socioeconomic History    Marital status:      Spouse name: Not on file    Number of children: Not on file    Years of education: Not on file    Highest education level: Not on file   Occupational History    Occupation: retired IT/   Tobacco Use    Smoking status: Former     Current packs/day: 0.00     Types: Cigarettes     Start date: 1/1/1978     Quit date: 1/1/2009     Years since quitting: 15.5    Smokeless tobacco: Never    Tobacco comments:     former smoker, 1/4-1/2 ppd off an on ages [age]   Substance and Sexual Activity    Alcohol use: Yes    Drug use: Never    Sexual activity: Yes     Partners: Female   Other Topics Concern    Parent/sibling w/ CABG, MI or angioplasty before 65F 55M? No   Social History Narrative    Not on file     Social Determinants of Health     Financial Resource Strain: Low Risk  (11/20/2023)    Financial Resource Strain     Within the past 12 months, have you or your family members you live with been unable to get utilities (heat, electricity) when it was really needed?: No   Food Insecurity: Low Risk  (11/20/2023)    Food Insecurity     Within the past 12 months, did you worry that your food would run out before you got money to buy more?: No     Within the past 12 months, did the food you bought just not last and you didn t have money to get more?: No   Transportation Needs: Low Risk  (11/20/2023)    Transportation Needs     Within the past 12 months, has lack of transportation kept you from medical appointments, getting your medicines, non-medical meetings or appointments, work, or from getting things that you need?: No   Physical Activity: Not on file   Stress: Not on file   Social Connections: Not on file   Interpersonal Safety: Low Risk  (11/21/2023)    Interpersonal Safety     Do you feel physically and emotionally safe where you currently live?: Yes     Within the  past 12 months, have you been hit, slapped, kicked or otherwise physically hurt by someone?: No     Within the past 12 months, have you been humiliated or emotionally abused in other ways by your partner or ex-partner?: No   Housing Stability: Low Risk  (11/20/2023)    Housing Stability     Do you have housing? : Yes     Are you worried about losing your housing?: No       Drug and lactation database from the [place] National Library of Medicine:  http://toxnet.nlm.nih.gov/cgi-bin/sis/htmlgen?LACT      B/P: Data Unavailable, T: Data Unavailable, P: Data Unavailable, R: Data Unavailable 0 lbs 0 oz  There were no vitals taken for this visit., There is no height or weight on file to calculate BMI.  Medications and Vitals not listed above were documented in the cart and reviewed by me.     Current Outpatient Medications   Medication Sig Dispense Refill    PSYLLIUM PO       rosuvastatin (CRESTOR) 10 MG tablet Take 1 tablet (10 mg) by mouth daily 90 tablet 3    sildenafil (VIAGRA) 50 MG tablet Take 1-2 tablets ( mg) by mouth daily as needed (erectile dysfunction) 30 tablet 5         Olu Vincent MD

## 2024-07-17 NOTE — PROGRESS NOTES
Diagnosis/Summary/Recommendations:    PATIENT: Edd Mcrae  69 year old male     : 1955    TREVON: Aug 16, 2024       MRN: [id number]  [address] [facility] [address]  Mobile Phone  [phone]  Email  [email or web address]     Edna lo daughter     Nikko cuellar spouse     scheduled per MB     He has mychart and has access to it and granted access to his wife Nikko     Assessment:  (Z82.0) Family history of Latah's disease  (primary encounter diagnosis)  Family history of HD  Sister Nunu with HD born  - onset of symptoms  was around 60 or 59 or 56   But not clear as to onset age    Mother presumably had HD  Maternal uncle presumed HD  Maternal cousin viky shetty with HD and passed away   Maternal cousin Hieu had HD and passed away     Right handed  Handwriting has been lazy - he tends to print things.   Denies symptoms of luther's disease       Review of diagnosis    Asymptomatic     Avoidance of dopamine blockers   Not taking    Motor complication review   N/a    Review of Impulse control disorders   N/a    Review of surgical or medication options   N/a    Gait/Balance/Falls   No falls     Exercise/Therapy performed/offered   Exercising     Cognitive/Driving   No significant cognitive problems - he has occasional forgetfulness  No driving problems    Mood   Lives with nikko cuellar his wife   10 years and together 13 years  This is a second marriage    1st marriage was 31 years and had 3 kids   Son Abiel  Daughter Melissa  Daughter Edna     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     Hallucinations/delusions   No     Sleep   No problems sleeping  May get up to urinate at times  Does not usually have RLS symptoms  May have problems if running - not running much  Sleeps in same bed as wife  Does not talk in his sleep usually  Not usually snoring.      Bladder/Renal/Prostate/Gyn/Other   Has been okay but being monitored   Having psa monitored -  last PSA was 1.73   Has had frequency on some days.     GI/Constipation/GERD   Colonic polyps  Colonoscopy x3  Diverticular disease  Taking psyllium   Has daily bowel movement  Rare heart burn      ENDO/Lipid/DM/Bone density/Thyroid  Lipid disorder  On rosuvastatin and have been fine    Impaired fasting glucose  Fasting glucose was okay   He had 96 to 106 or so. 98 last time.     No thyroid testing     Cardio/heart/Hyper or Hypotensive   Blood pressure has been okay     Vision/Dry Eyes/Cataracts/Glaucoma/Macular   Suspect glaucoma - supposedly okay   Paternal aunt had glaucoma  Wears glasses  Nuclear sclerosis of both eyes - has not had surgery     Vision/Dry Eyes/Cataracts/Glaucoma/Macular   above    Heme/Anticoagulation/Antiplatelet/Anemia/Other  Not taking aspirin    ENT/Resp  No loss of smell or taste  He had covid    Skin/Cancer/Seborrhea/other  Precancerous burned/froze off.   Warts   No other skin problems.     Musculoskeletal/Pain/Headache  Wire removed from leg - lawnmower accident   No arthritis issues  Plays volleyball 3 times per week     Other:       Medications                               Psyllium              Rosuvastatin crestor 10mg             Sildenafil viagra 50mg                                                      He has some slight problems with the luria task  There are no problems with motor taping  He has normal tandem and toe and heel walking.   He has normal facial expression and voice volume   The are no generalized choreiform or focal movements.   He has some slight changes in his eye movements.     Plan:    He understands the implications of the test and is willing to return with his wife.     He has a return visit with Ad Lester on the 13th of September 2024.      Future Appointments 8/16/2024 - 2/12/2025        Date Visit Type Length Department Provider     8/16/2024  3:00 PM LAB 15  min [facility]    Location Instructions:     The [facility] ([facility] area with multiple transportation and parking options. You may wish to review options for  service and self-parking in more detail on the [facility] website at [email or web address].&nbsp;               9/13/2024  8:00 AM RETURN NEUROLOGY 30 min [facility] Ilia Lester GC    Location Instructions:     The [facility] ([facility] is in a dense urban area with multiple transportation and parking options. You may wish to review options for  service and self-parking in more detail on the [facility] website at [email or web address].&nbsp;                12/3/2024  8:30 AM [facility]ARE ANNUAL WELLNESS 30 min  INTERNAL MEDICINE Storm Tamayo MD    Location Instructions:     [facility] is in the [facility] at [address] [place] [address] in [place]. This just east of the [address] [place] [address] exit off of [address]. Free parking is available; access the lot from [address] [place] [address] or [facility].                            Coding statement:   Medical Decision Making:  #  Chronic progressive medical conditions addressed  - see above --   Review and/or interpretation of unique test or documentation from a provider outside of neurology yes   Independent historian provided additional details  no I  Prescription drug management and review of potential side effects and/or monitoring for side effects  -- see above ---  Health impacted by social determinants of health  no    I have reviewed the note as documented above.  This accurately captures the substance of my conversation with the patient and total time spent preparing for visit, executing visit and completing visit on the day of the visit:  45 minutes.  The portion of this total time included face to face time     The longitudinal plan of care for Edd Mcrae was addressed during this visit. Due to the  added complexity in care, I will continue to support Edd Mcrae in the subsequent management of this condition(s) and with the ongoing continuity of care of this condition(s).      Olu Vincent MD     ______________________________________    Last visit date and details:             ______________________________________      Patient was asked about 14 Review of systems including changes in vision (dry eyes, double vision), hearing, heart, lungs, musculoskeletal, depression, anxiety, snoring, RBD, insomnia, urinary frequency, urinary urgency, constipation, swallowing problems, hematological, ID, allergies, skin problems: seborrhea, endocrinological: thyroid, diabetes, cholesterol; balance, weight changes, and other neurological problems and these were not significant at this time except for   Patient Active Problem List   Diagnosis    Hyperlipidemia LDL goal <130    Glaucoma suspect, bilateral    Nuclear sclerosis of both eyes    Dyslipidemia    Benign prostatic hyperplasia    Adenomatous colon polyp    IFG (impaired fasting glucose)    Benign neoplasm of transverse colon    Diverticular disease of large intestine    History of colonic polyps    Polyp of colon    Family history of Amador's disease        No Known Allergies  Past Surgical History:   Procedure Laterality Date    COLONOSCOPY  2013 July    expecting confirmation PN    NO HISTORY OF SURGERY       Past Medical History:   Diagnosis Date    Family history of Farrukh's disease 07/16/2024    Hyperlipidemia LDL goal <130 2018         Social History     Socioeconomic History    Marital status:      Spouse name: Not on file    Number of children: Not on file    Years of education: Not on file    Highest education level: Not on file   Occupational History    Occupation: retired IT/   Tobacco Use    Smoking status: Former     Current packs/day: 0.00     Types: Cigarettes     Start date: 1/1/1978     Quit date: 1/1/2009     Years  since quitting: 15.5    Smokeless tobacco: Never    Tobacco comments:     former smoker, 1/4-1/2 ppd off an on ages [age]   Substance and Sexual Activity    Alcohol use: Yes    Drug use: Never    Sexual activity: Yes     Partners: Female   Other Topics Concern    Parent/sibling w/ CABG, MI or angioplasty before 65F 55M? No   Social History Narrative    Not on file     Social Determinants of Health     Financial Resource Strain: Low Risk  (11/20/2023)    Financial Resource Strain     Within the past 12 months, have you or your family members you live with been unable to get utilities (heat, electricity) when it was really needed?: No   Food Insecurity: Low Risk  (11/20/2023)    Food Insecurity     Within the past 12 months, did you worry that your food would run out before you got money to buy more?: No     Within the past 12 months, did the food you bought just not last and you didn t have money to get more?: No   Transportation Needs: Low Risk  (11/20/2023)    Transportation Needs     Within the past 12 months, has lack of transportation kept you from medical appointments, getting your medicines, non-medical meetings or appointments, work, or from getting things that you need?: No   Physical Activity: Not on file   Stress: Not on file   Social Connections: Not on file   Interpersonal Safety: Low Risk  (11/21/2023)    Interpersonal Safety     Do you feel physically and emotionally safe where you currently live?: Yes     Within the past 12 months, have you been hit, slapped, kicked or otherwise physically hurt by someone?: No     Within the past 12 months, have you been humiliated or emotionally abused in other ways by your partner or ex-partner?: No   Housing Stability: Low Risk  (11/20/2023)    Housing Stability     Do you have housing? : Yes     Are you worried about losing your housing?: No       Drug and lactation database from the [place] [employer]  Medicine:  http://toxnet.nlm.nih.gov/cgi-bin/sis/htmlgen?LACT      B/P: Data Unavailable, T: Data Unavailable, P: Data Unavailable, R: Data Unavailable 0 lbs 0 oz  There were no vitals taken for this visit., There is no height or weight on file to calculate BMI.  Medications and Vitals not listed above were documented in the cart and reviewed by me.     Current Outpatient Medications   Medication Sig Dispense Refill    PSYLLIUM PO       rosuvastatin (CRESTOR) 10 MG tablet Take 1 tablet (10 mg) by mouth daily 90 tablet 3    sildenafil (VIAGRA) 50 MG tablet Take 1-2 tablets ( mg) by mouth daily as needed (erectile dysfunction) 30 tablet 5         Olu Vincent MD

## 2024-08-15 ENCOUNTER — VIRTUAL VISIT (OUTPATIENT)
Dept: PSYCHOLOGY | Facility: CLINIC | Age: 69
End: 2024-08-15
Payer: COMMERCIAL

## 2024-08-15 DIAGNOSIS — F43.20 ADJUSTMENT DISORDER, UNSPECIFIED TYPE: Primary | ICD-10-CM

## 2024-08-15 PROCEDURE — 90791 PSYCH DIAGNOSTIC EVALUATION: CPT | Mod: 95 | Performed by: PSYCHOLOGIST

## 2024-08-15 NOTE — PROGRESS NOTES
"INITIAL PSYCHOLOGY INTERVIEW AND TREATMENT PLAN  based on a virtual encounter.  Call and billing for call consented.  Provider called client at his home at 2-2:50pm (50 min).  Referred by: AILEEN Lester MS, Genetic Counselor ;  FRANKLIN Vincent MD, Neurology  Identifying information.  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 He has one younger brother (4 1/2 yrs younger) and one older sister (RN). His father  at 72yo of a cerebral hemorrhage (\"abrupt death) and was a benz.  In his mother's nuclear family, there were 6 girls and one boy.  She was believed to have HD, but evidently for many years had subtle symptoms - many of her sisters  early and were not known to be affected with HD; 2 sisters were affected. His sister also is affected.  He has 3 children from his first marriage (ages 44yo ([place]), 44yo, 37yo) - demonstrate no signs of HD.  He was born and raised in [place], moving to [place] 25 years ago.  He notes that both of his parents  \"drank substantially\".  Presenting problem.  Family history of [place] Disease (HD)  History of presenting problem.   MN#1.    FAMILY HISTORY OF HD.  \"My eldest daughter  (has daughter 12yo, boy 17yo) after me to have this test - has been talking with her cousin. . she also is 'type A', sees the world as orderly\".  The Cartwright family (his mother's family) has \"a long hx of HD - lost my older cousin recently, sister has it (70yo-symptoms \"beginning in her  late 50's\"), mother had it (\"did not show the disease heavily\"), her mother had it ( at 78yo/) - \"don't know the age of their first symptoms\".  \"I've sworn his daughter to secrecy -her  brother and sister don't know (he's seeking testing). He describes he has had a \"great life up to this point - if (test) positive, might push (to have others tested). .haven't decided if I want them to take the test\".  He " "has \"all the g'children\" he is going to have.  He notes his sister \"is getting worse\".  His adult children may not know their g'mother had HD, especially sine their was no autopsy.  He does b\" not think\" having HD \"crosses his mind (son)\" - he is uncertain what his younger daughter might think or understand..    Health.  \"Good\".  No signs/symptoms.  Why seek testing now?  Oldest daughter is the incentive.  (Note. I do not know if she is more insistent now?)  If you were to test POSITIVE?.  \"How to do I disseminate the information to my chidren?\".  \"Longer term, I feel sorry for my wife.  Maybe make different plans\".  If you were to test NEGATIVE.  \"Not affect my life at all\"  \"Tell my children and my sister.  Tell my brother\" His brother has juvenile onset diabetes - \"never thought he would live this long, had a stroke before\".  Brother has children.   IMPRESSION.  Healthy older adult with maternal family HD history who doubts he is affected - g'mother, mother, sister affected (and other relatives, e.g., cousin(s)).  Aware of the significant impact of the disease.  Oldest adult daughter wants him to be tested and is described as someone interested in information, orderliness.  Additional family and personal history.  Information incomplete.  The past 5 years in alf, he has been active/\"involved\", e.g., in DFL, meals-on-wheels, etc.  Health.  \"Good\" - takes \"statins\" although cholesterol not elevated.    Mental health.  Depression meds when  but no longer - 2009 . Describes self as emotionally stable.  Mental status. Mr Mcrae presented himself in a cooperative, polite, straight-forward manner.  He was somewhat spontaneous, answered questions readily though with [id number] elaboration or emotion., and gave the impression of a private, controlled individual. Thought content focused on the facts of his family HD history.  Near the end of our appointment, he became somewhat abrupt.  He thought " "the purpose of our interview was to help him understand how to disclose test results to his family.  I explained the history of having a psychologist as part of the pre-symptomatic testing process.  I also discussed the issue of sharing health-related information with family members, cautioning about the generally negative impact of family secrets.  It is noteworthy that he \"swore\" his oldest daughter to secrecy about his seeking testing. His wife is supportive.  Attention, memory, orientation, judgment, concentration, thought processes were satisfactory.  Affect: difficult to assess remotely.  Mood: stable.  The overall impression is of a cooperative and emotionally/physically healthy individual influenced by his older daughter to have HD testing and who, in a single interview, reveals little about himself.  Diagnosis    Axis I.    Adjustment disorder  Axis II.   Deferred  Axis III. Family history of [place] Disease  Axis IV. Psychosocial stress:  LOW (?)  Axis V.  GAF past year:  90 GAF current:  90  PLAN.  Proceed with appointment with Dr ESTHER Vincent and AILEEN Lester.  I also will communicate with Mr. Lester.  "

## 2024-08-16 ENCOUNTER — TELEPHONE (OUTPATIENT)
Dept: NEUROLOGY | Facility: CLINIC | Age: 69
End: 2024-08-16

## 2024-08-16 ENCOUNTER — LAB (OUTPATIENT)
Dept: LAB | Facility: CLINIC | Age: 69
End: 2024-08-16
Payer: COMMERCIAL

## 2024-08-16 ENCOUNTER — OFFICE VISIT (OUTPATIENT)
Dept: NEUROLOGY | Facility: CLINIC | Age: 69
End: 2024-08-16
Payer: COMMERCIAL

## 2024-08-16 DIAGNOSIS — Z13.71 SCREENING FOR GENETIC DISEASE CARRIER STATUS: ICD-10-CM

## 2024-08-16 DIAGNOSIS — Z82.0 FAMILY HISTORY OF HUNTINGTON'S DISEASE: Primary | ICD-10-CM

## 2024-08-16 DIAGNOSIS — Z82.0 FAMILY HISTORY OF HUNTINGTON'S DISEASE: ICD-10-CM

## 2024-08-16 LAB
LAB DIRECTOR COMMENTS: NORMAL
LAB DIRECTOR DISCLAIMER: NORMAL
LAB DIRECTOR INTERPRETATION: NORMAL
LAB DIRECTOR METHODOLOGY: NORMAL
LAB DIRECTOR RESULTS: NORMAL
LOCATION OF TASK: NORMAL
SPECIMEN DESCRIPTION: NORMAL

## 2024-08-16 PROCEDURE — 96040 PR GENETIC COUNSELING, EACH 30 MIN: CPT | Performed by: GENETIC COUNSELOR, MS

## 2024-08-16 PROCEDURE — G2211 COMPLEX E/M VISIT ADD ON: HCPCS | Performed by: PSYCHIATRY & NEUROLOGY

## 2024-08-16 PROCEDURE — 36415 COLL VENOUS BLD VENIPUNCTURE: CPT | Performed by: PATHOLOGY

## 2024-08-16 PROCEDURE — G0452 MOLECULAR PATHOLOGY INTERPR: HCPCS | Mod: 26 | Performed by: STUDENT IN AN ORGANIZED HEALTH CARE EDUCATION/TRAINING PROGRAM

## 2024-08-16 PROCEDURE — 81271 HTT GENE DETC ABNOR ALLELES: CPT | Performed by: GENETIC COUNSELOR, MS

## 2024-08-16 PROCEDURE — 99204 OFFICE O/P NEW MOD 45 MIN: CPT | Performed by: PSYCHIATRY & NEUROLOGY

## 2024-08-16 NOTE — TELEPHONE ENCOUNTER
Patient confirmed scheduled appointment:  Date: 9/13  Time: 8 am   Visit type: Return Neurology   Provider: Ad Lester  Location: [facility]- pt aware   Testing/imaging: n/a  Additional notes: In basket msg sent for appointment request         Brittney Shafer on 8/16/2024 at 1:50 PM

## 2024-08-16 NOTE — PROGRESS NOTES
Genetic Counseling  Ronit Mcrae was seen today for predictive testing for Patrick's Disease due to a family history of HD.  I spent a total of 30 minutes with the patient with the majority of the time being spent on genetic counseling and testing options. The patient also underwent a neurological examination from Dr. Vincent after our visit.      Medical History  Please see Dr. Vincent's note for a thorough summary of medical history. Briefly, Ronit is presenting for predictive genetic testing for HD and does not believe that he has any clinical findings of HD at the present time.     Family History  Ronit reported an extensive history of HD in his mother's siblings and in her extended family.    Ronit's mother was never formally diagnosed with HD, but was noted to have abnormal movements suggestive of chorea later in life. She  at age 79.  Ronit's sister is reported to have HD with onset in her 50's. She is currently age 71. Because she is affected, we know in retrospect that Ronit's mother must have had HD.  Ronit believes that his [facility] likely [facility].  Ronit has three adult children who are all healthy at the present time.     Social history  Ronit has not wanted to have testing in the past, but was prompted to pursue testing by his oldest daughter.  He worked as an  and in IT.  He is  to [place].  He indicates that his only motivation for testing is the request from his oldest daughter. He indicated that if he tests positive, he is only planning to share this with his oldest daughter at the present time and would want to take additional time to think about how to share this with his other two children.     Farrukh Disease     Farrukh disease is an autosomal dominant neurodegenerative disorder characterized by abnormal choreiform movements, behavior/mood changes, and cognitive decline. Farrukh disease is caused by an abnormal number of CAG trinucleotide repeats  on the HTT gene.     The number of trinucleotide repeats can vary among individuals.     Individuals with 9 to 26 repeats are considered normal and will not develop symptoms of the disease.     There is an intermediate range of 27-35 repeats that will not develop symptoms but those in this range may be at an increased risk of having children with the disease.     The 36-39 range is considered disease causing but, may show reduced penetrance with the possibility of a later onset of symptoms.     Repeats of 40 or more are fully penetrant and will develop symptoms of the disease with an average age of onset in their mid-thirties to early forties.     Families can exhibit anticipation particularly with paternal transmission of the abnormal repeats. Anticipation is a phenomenon in which families see earlier ages of onset and increasing severity of symptoms with subsequent generations. When inherited, particularly from father to offspring, the repeat length can expand and create longer regions of the CAG repeat. Age of onset has been correlated with length of trinucleotide repeats. In addition, longer repeats generally lead to earlier age of onset and increasing severity of symptoms.     I explained that if Ronit's test is abnormal, then we would expect them to develop symptoms of HD. In addition, there would be a 50% risk to any current or future children. If they test normal (both alleles in the normal range), then they will not develop HD and cannot pass it to future children. If they have a repeat in the intermediate or reduced penetrance range, we would discuss the implications of such a result in more detail. We would like to confirm these results if possible.      I explained that if Ronit truly does not wish to know his HD status, it still is possible for his daughter to have her own testing and they could simply agree that she would not share results with him. Ronit understood this option, but indicated that he would  still like to be tested.    We also discussed that while it is reassuring that he does not have symptoms at the present time, this cannot definitively exclude the possibility that he could test positive. We discussed reduced penetrance alleles and the fact that we have observed individuals who have not been diagnosed until very late in life (70's-90's).      Plan  The patient decided to proceed with testing today. They proceeded to the lab after our visit to complete the blood draw. We informed Pat that the results take between three and four week to return and all results for [place] disease are given in person. We scheduled a results appointment for 9/13 at 8:00AM. We informed the patient that we highly recommend having a person at the appointment for emotional support. We spoke about the importance of having a mental health plan in place before received their results.     Ad Lester MS, [facility]  Certified Genetic Counselor

## 2024-08-16 NOTE — LETTER
2024       RE: Edd Mcrae  [address] [facility] [address]     Dear Colleague,    Thank you for referring your patient, Edd Mcrae, to the [facility] NEUROLOGY CLINIC [facility]. Please see a copy of my visit note below.        Diagnosis/Summary/Recommendations:    PATIENT: Edd Mcrae  69 year old male     : 1955    TREVON: Aug 16, 2024       MRN: [id number]  [address] [facility] [address]  Mobile Phone  [phone]  Email  [email or web address]     Edna lo daughter     Nikko cuellar spouse     scheduled per MB     He has [place] and has access to it and granted access to his wife Nikko     Assessment:  (Z82.0) Family history of Alpha's disease  (primary encounter diagnosis)  Family history of HD  Sister Nunu with HD born  - onset of symptoms  was around 60 or 59 or 56   But not clear as to onset age    Mother presumably had HD  Maternal uncle presumed HD  Maternal cousin viky shetty with HD and passed away   Maternal cousin Hieu had HD and passed away     Right handed  Handwriting has been lazy - he tends to print things.   Denies symptoms of luther's disease       Review of diagnosis    Asymptomatic     Avoidance of dopamine blockers   Not taking    Motor complication review   N/a    Review of Impulse control disorders   N/a    Review of surgical or medication options   N/a    Gait/Balance/Falls   No falls     Exercise/Therapy performed/offered   Exercising     Cognitive/Driving   No significant cognitive problems - he has occasional forgetfulness  No driving problems    Mood   Lives with nikko saucedowhit his wife   10 years and together 13 years  This is a second marriage    1st marriage was 31 years and had 3 kids   Son Abiel  Daughter Melissa  Daughter Edna     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     Hallucinations/delusions   No     Sleep   No problems sleeping  May get up to urinate at times  Does not usually have RLS symptoms  May have problems if running - not running much  Sleeps in same bed as wife  Does not talk in his sleep usually  Not usually snoring.     Bladder/Renal/Prostate/Gyn/Other   Has been okay but being monitored   Having psa monitored -  last PSA was 1.73   Has had frequency on some days.     GI/Constipation/GERD   Colonic polyps  Colonoscopy x3  Diverticular disease  Taking psyllium   Has daily bowel movement  Rare heart burn      ENDO/Lipid/DM/Bone density/Thyroid  Lipid disorder  On rosuvastatin and have been fine    Impaired fasting glucose  Fasting glucose was okay   He had 96 to 106 or so. 98 last time.     No thyroid testing     Cardio/heart/Hyper or Hypotensive   Blood pressure has been okay     Vision/Dry Eyes/Cataracts/Glaucoma/Macular   Suspect glaucoma - supposedly okay   Paternal aunt had glaucoma  Wears glasses  Nuclear sclerosis of both eyes - has not had surgery     Vision/Dry Eyes/Cataracts/Glaucoma/Macular   above    Heme/Anticoagulation/Antiplatelet/Anemia/Other  Not taking aspirin    ENT/Resp  No loss of smell or taste  He had covid    Skin/Cancer/Seborrhea/other  Precancerous burned/froze off.   Warts   No other skin problems.     Musculoskeletal/Pain/Headache  Wire removed from leg - lawnmower accident   No arthritis issues  Plays volleyball 3 times per week     Other:       Medications                               Psyllium              Rosuvastatin crestor 10mg             Sildenafil viagra 50mg                                                      He has some slight problems with the luria task  There are no problems with motor taping  He has normal tandem and toe and heel walking.   He has normal facial expression and voice volume   The are no generalized choreiform or focal movements.   He has some slight  changes in his eye movements.     Plan:    He understands the implications of the test and is willing to return with his wife.     He has a return visit with Ad Lester on the 13th of September 2024.      Future Appointments 8/16/2024 - 2/12/2025        Date Visit Type Length Department Provider     8/16/2024  3:00 PM LAB 15 min [facility] LABORATORY [facility] LAB    Location Instructions:     The [facility] [place] ([facility]) is [place] area with multiple transportation and parking options. You may wish to review options for  service and self-parking in more detail on the [facility] website at [email or web address].&nbsp;               9/13/2024  8:00 AM RETURN NEUROLOGY 30 min [facility] NEUROLOGY Ilia Lester GC    Location Instructions:     The [facility] ([facility]) is in a dense urban area with multiple transportation and parking options. You may wish to review options for  service and self-parking in more detail on the [facility] website at [email or web address].&nbsp;                12/3/2024  8:30 AM [facility] ANNUAL WELLNESS 30 min  INTERNAL MEDICINE Storm Tamayo MD    Location Instructions:     [facility] is in the [facility] at [address] in [place]. This just east of the [address] exit off of [address]. Free parking is available; access the lot from [address] [place] [address] or [facility].                            Coding statement:   Medical Decision Making:  #  Chronic progressive medical conditions addressed  - see above --   Review and/or interpretation of unique test or documentation from a provider outside of neurology yes   Independent historian provided additional details  no I  Prescription drug management and review of potential side effects and/or monitoring for side effects  -- see above ---  Health impacted by social determinants of health  no    I have reviewed the note as documented above.  This accurately  captures the substance of my conversation with the patient and total time spent preparing for visit, executing visit and completing visit on the day of the visit:  45 minutes.  The portion of this total time included face to face time     The longitudinal plan of care for Edd Mcrae was addressed during this visit. Due to the added complexity in care, I will continue to support Edd Mcrae in the subsequent management of this condition(s) and with the ongoing continuity of care of this condition(s).      Olu Vincent MD     ______________________________________    Last visit date and details:             ______________________________________      Patient was asked about 14 Review of systems including changes in vision (dry eyes, double vision), hearing, heart, lungs, musculoskeletal, depression, anxiety, snoring, RBD, insomnia, urinary frequency, urinary urgency, constipation, swallowing problems, hematological, ID, allergies, skin problems: seborrhea, endocrinological: thyroid, diabetes, cholesterol; balance, weight changes, and other neurological problems and these were not significant at this time except for   Patient Active Problem List   Diagnosis     Hyperlipidemia LDL goal <130     Glaucoma suspect, bilateral     Nuclear sclerosis of both eyes     Dyslipidemia     Benign prostatic hyperplasia     Adenomatous colon polyp     IFG (impaired fasting glucose)     Benign neoplasm of transverse colon     Diverticular disease of large intestine     History of colonic polyps     Polyp of colon     Family history of Rich's disease        No Known Allergies  Past Surgical History:   Procedure Laterality Date     COLONOSCOPY  2013 July    expecting confirmation PN     NO HISTORY OF SURGERY       Past Medical History:   Diagnosis Date     Family history of Rich's disease 07/16/2024     Hyperlipidemia LDL goal <130 2018         Social History     Socioeconomic History     Marital status:       Spouse name: Not on file     Number of children: Not on file     Years of education: Not on file     Highest education level: Not on file   Occupational History     Occupation: retired IT/   Tobacco Use     Smoking status: Former     Current packs/day: 0.00     Types: Cigarettes     Start date: 1/1/1978     Quit date: 1/1/2009     Years since quitting: 15.5     Smokeless tobacco: Never     Tobacco comments:     former smoker, 1/4-1/2 ppd off an on ages [age]   Substance and Sexual Activity     Alcohol use: Yes     Drug use: Never     Sexual activity: Yes     Partners: Female   Other Topics Concern     Parent/sibling w/ CABG, MI or angioplasty before 65F 55M? No   Social History Narrative     Not on file     Social Determinants of Health     Financial Resource Strain: Low Risk  (11/20/2023)    Financial Resource Strain      Within the past 12 months, have you or your family members you live with been unable to get utilities (heat, electricity) when it was really needed?: No   Food Insecurity: Low Risk  (11/20/2023)    Food Insecurity      Within the past 12 months, did you worry that your food would run out before you got money to buy more?: No      Within the past 12 months, did the food you bought just not last and you didn t have money to get more?: No   Transportation Needs: Low Risk  (11/20/2023)    Transportation Needs      Within the past 12 months, has lack of transportation kept you from medical appointments, getting your medicines, non-medical meetings or appointments, work, or from getting things that you need?: No   Physical Activity: Not on file   Stress: Not on file   Social Connections: Not on file   Interpersonal Safety: Low Risk  (11/21/2023)    Interpersonal Safety      Do you feel physically and emotionally safe where you currently live?: Yes      Within the past 12 months, have you been hit, slapped, kicked or otherwise physically hurt by someone?: No      Within the past 12 months,  have you been humiliated or emotionally abused in other ways by your partner or ex-partner?: No   Housing Stability: Low Risk  (11/20/2023)    Housing Stability      Do you have housing? : Yes      Are you worried about losing your housing?: No       Drug and lactation database from the [place] National Library of Medicine:  http://toxnet.nlm.nih.gov/cgi-bin/sis/htmlgen?LACT      B/P: Data Unavailable, T: Data Unavailable, P: Data Unavailable, R: Data Unavailable 0 lbs 0 oz  There were no vitals taken for this visit., There is no height or weight on file to calculate BMI.  Medications and Vitals not listed above were documented in the cart and reviewed by me.     Current Outpatient Medications   Medication Sig Dispense Refill     PSYLLIUM PO        rosuvastatin (CRESTOR) 10 MG tablet Take 1 tablet (10 mg) by mouth daily 90 tablet 3     sildenafil (VIAGRA) 50 MG tablet Take 1-2 tablets ( mg) by mouth daily as needed (erectile dysfunction) 30 tablet 5         Olu Vincent MD

## 2024-08-16 NOTE — LETTER
2024       RE: Edd Mcrae  [address] [facility] [address]     Dear Colleague,    Thank you for referring your patient, Edd Mcrae, to the [facility] NEUROLOGY CLINIC [facility]. Please see a copy of my visit note below.    Genetic Counseling  Ronit Mcrae was seen today for predictive testing for Farrukh's Disease due to a family history of HD.  I spent a total of 30 minutes with the patient with the majority of the time being spent on genetic counseling and testing options. The patient also underwent a neurological examination from Dr. Vincent after our visit.      Medical History  Please see Dr. Vincent's note for a thorough summary of medical history. Briefly, Ronit is presenting for predictive genetic testing for HD and does not believe that he has any clinical findings of HD at the present time.     Family History  Ronit reported an extensive history of HD in his mother's siblings and in her extended family.    Ronit's mother was never formally diagnosed with HD, but was noted to have abnormal movements suggestive of chorea later in life. She  at age 79.  Ronit's sister is reported to have HD with onset in her 50's. She is currently age 71. Because she is affected, we know in retrospect that Ronit's mother must have had HD.  Ronit believes that his sister likely had genetic testing through the [facility].  Ronit has three adult children who are all healthy at the present time.     Social history  Ronit has not wanted to have testing in the past, but was prompted to pursue testing by his oldest daughter.  He worked as an  and in IT.  He is  to Haylie.  He indicates that his only motivation for testing is the request from his oldest daughter. He indicated that if he tests positive, he is only planning to share this with his oldest daughter at the present time and would want to take additional time to think  about how to share this with his other two children.     Farrukh Disease     [place] disease is an autosomal dominant neurodegenerative disorder characterized by abnormal choreiform movements, behavior/mood changes, and cognitive decline. [place]achua disease is caused by an abnormal number of CAG trinucleotide repeats on the HTT gene.     The number of trinucleotide repeats can vary among individuals.     Individuals with 9 to 26 repeats are considered normal and will not develop symptoms of the disease.     There is an intermediate range of 27-35 repeats that will not develop symptoms but those in this range may be at an increased risk of having children with the disease.     The 36-39 range is considered disease causing but, may show reduced penetrance with the possibility of a later onset of symptoms.     Repeats of 40 or more are fully penetrant and will develop symptoms of the disease with an average age of onset in their mid-thirties to early forties.     Families can exhibit anticipation particularly with paternal transmission of the abnormal repeats. Anticipation is a phenomenon in which families see earlier ages of onset and increasing severity of symptoms with subsequent generations. When inherited, particularly from father to offspring, the repeat length can expand and create longer regions of the CAG repeat. Age of onset has been correlated with length of trinucleotide repeats. In addition, longer repeats generally lead to earlier age of onset and increasing severity of symptoms.     I explained that if Pat's test is abnormal, then we would expect them to develop symptoms of HD. In addition, there would be a 50% risk to any current or future children. If they test normal (both alleles in the normal range), then they will not develop HD and cannot pass it to future children. If they have a repeat in the intermediate or reduced penetrance range, we would discuss the implications of such a result in  more detail. We would like to confirm these results if possible.      I explained that if Ronit truly does not wish to know his HD status, it still is possible for his daughter to have her own testing and they could simply agree that she would not share results with him. Pat understood this option, but indicated that he would still like to be tested.    We also discussed that while it is reassuring that he does not have symptoms at the present time, this cannot definitively exclude the possibility that he could test positive. We discussed reduced penetrance alleles and the fact that we have observed individuals who have not been diagnosed until very late in life (70's-90's).      Plan  The patient decided to proceed with testing today. They proceeded to the lab after our visit to complete the blood draw. We informed Pat that the results take between three and four week to return and all results for [place] disease are given in person. We scheduled a results appointment for 9/13 at 8:00AM. We informed the patient that we highly recommend having a person at the appointment for emotional support. We spoke about the importance of having a mental health plan in place before received their results.     Ad Lester MS, [facility]  Certified Genetic Counselor       Again, thank you for allowing me to participate in the care of your patient.      Sincerely,    Ilia Lester GC

## 2024-08-16 NOTE — PATIENT INSTRUCTIONS
He has some slight problems with the luria task  There are no problems with motor taping  He has normal tandem and toe and heel walking.   He has normal facial expression and voice volume   The are no generalized choreiform or focal movements.   He has some slight changes in his eye movements.     Plan:    He understands the implications of the test and is willing to return with his wife.     He has a return visit with Ad Lester on the 13th of September 2024.

## 2024-09-13 ENCOUNTER — OFFICE VISIT (OUTPATIENT)
Dept: NEUROLOGY | Facility: CLINIC | Age: 69
End: 2024-09-13
Payer: COMMERCIAL

## 2024-09-13 DIAGNOSIS — Z82.0 FAMILY HISTORY OF HUNTINGTON'S DISEASE: Primary | ICD-10-CM

## 2024-09-13 PROCEDURE — 99207 PR STATISTIC GENETIC COUNSELING, <16 MIN: CPT | Performed by: GENETIC COUNSELOR, MS

## 2024-09-13 NOTE — LETTER
9/13/2024       RE: Edd Mcrae  [address] [facility] [address]     Dear Colleague,    Thank you for referring your patient, Edd Mcrae, to the [facility] NEUROLOGY CLINIC [facility]. Please see a copy of my visit note below.    GENETIC COUNSELING-Neurology  I met with Ronit and his wife, Haylie, to review NORMAL genetic testing for [place] disease. He had 17 and 19 repeats in the HTT gene.  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 He will not develop HD and cannot pass HD to any of his children.  His brother does remain at 50% risk.  All questions were answered. I spent 10 minutes with them in clinic.    Ad Lester MS, [facility]  Certified Genetic Counselor     Again, thank you for allowing me to participate in the care of your patient.      Sincerely,    Ilia Lester GC

## 2024-09-13 NOTE — PROGRESS NOTES
GENETIC COUNSELING-Neurology  I met with Ronit and his wife, Haylie, to review NORMAL genetic testing for [place] disease. He had 17 and 19 repeats in the HTT gene.  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 He will not develop HD and cannot pass HD to any of his children.  His brother does remain at 50% risk.  All questions were answered. I spent 10 minutes with them in clinic.    Ad Lester MS, [facility]  Certified Genetic Counselor

## 2024-10-08 ENCOUNTER — IMMUNIZATION (OUTPATIENT)
Dept: INTERNAL MEDICINE | Facility: CLINIC | Age: 69
End: 2024-10-08
Payer: COMMERCIAL

## 2024-10-08 PROCEDURE — 90480 ADMN SARSCOV2 VAC 1/ONLY CMP: CPT

## 2024-10-08 PROCEDURE — 91320 SARSCV2 VAC 30MCG TRS-SUC IM: CPT

## 2024-10-08 PROCEDURE — G0008 ADMIN INFLUENZA VIRUS VAC: HCPCS

## 2024-10-08 PROCEDURE — 90662 IIV NO PRSV INCREASED AG IM: CPT

## 2024-11-12 DIAGNOSIS — E78.5 HYPERLIPIDEMIA LDL GOAL <130: ICD-10-CM

## 2024-11-12 RX ORDER — ROSUVASTATIN CALCIUM 10 MG/1
10 TABLET, COATED ORAL DAILY
Qty: 90 TABLET | Refills: 0 | Status: SHIPPED | OUTPATIENT
Start: 2024-11-12

## 2024-12-03 ENCOUNTER — OFFICE VISIT (OUTPATIENT)
Dept: INTERNAL MEDICINE | Facility: CLINIC | Age: 69
End: 2024-12-03
Attending: INTERNAL MEDICINE
Payer: COMMERCIAL

## 2024-12-03 VITALS
OXYGEN SATURATION: 98 % | HEART RATE: 67 BPM | RESPIRATION RATE: 15 BRPM | TEMPERATURE: 97.6 F | BODY MASS INDEX: 28.27 KG/M2 | SYSTOLIC BLOOD PRESSURE: 130 MMHG | WEIGHT: 190.9 LBS | HEIGHT: 69 IN | DIASTOLIC BLOOD PRESSURE: 76 MMHG

## 2024-12-03 DIAGNOSIS — E78.5 HYPERLIPIDEMIA LDL GOAL <130: ICD-10-CM

## 2024-12-03 DIAGNOSIS — Z13.6 SCREENING FOR AAA (ABDOMINAL AORTIC ANEURYSM): ICD-10-CM

## 2024-12-03 DIAGNOSIS — Z13.6 CARDIOVASCULAR SCREENING; LDL GOAL LESS THAN 130: ICD-10-CM

## 2024-12-03 DIAGNOSIS — Z00.00 MEDICARE ANNUAL WELLNESS VISIT, SUBSEQUENT: Primary | ICD-10-CM

## 2024-12-03 DIAGNOSIS — Z12.5 SCREENING FOR PROSTATE CANCER: ICD-10-CM

## 2024-12-03 LAB
ANION GAP SERPL CALCULATED.3IONS-SCNC: 8 MMOL/L (ref 7–15)
BUN SERPL-MCNC: 13.1 MG/DL (ref 8–23)
CALCIUM SERPL-MCNC: 9.5 MG/DL (ref 8.8–10.4)
CHLORIDE SERPL-SCNC: 101 MMOL/L (ref 98–107)
CHOLEST SERPL-MCNC: 156 MG/DL
CREAT SERPL-MCNC: 0.91 MG/DL (ref 0.67–1.17)
EGFRCR SERPLBLD CKD-EPI 2021: >90 ML/MIN/1.73M2
FASTING STATUS PATIENT QL REPORTED: YES
FASTING STATUS PATIENT QL REPORTED: YES
GLUCOSE SERPL-MCNC: 103 MG/DL (ref 70–99)
HCO3 SERPL-SCNC: 29 MMOL/L (ref 22–29)
HDLC SERPL-MCNC: 53 MG/DL
HGB BLD-MCNC: 14.6 G/DL (ref 13.3–17.7)
LDLC SERPL CALC-MCNC: 90 MG/DL
NONHDLC SERPL-MCNC: 103 MG/DL
POTASSIUM SERPL-SCNC: 4.4 MMOL/L (ref 3.4–5.3)
PSA SERPL DL<=0.01 NG/ML-MCNC: 2.13 NG/ML (ref 0–4.5)
SODIUM SERPL-SCNC: 138 MMOL/L (ref 135–145)
TRIGL SERPL-MCNC: 65 MG/DL

## 2024-12-03 PROCEDURE — 80048 BASIC METABOLIC PNL TOTAL CA: CPT | Performed by: INTERNAL MEDICINE

## 2024-12-03 PROCEDURE — 80061 LIPID PANEL: CPT | Performed by: INTERNAL MEDICINE

## 2024-12-03 PROCEDURE — G0103 PSA SCREENING: HCPCS | Performed by: INTERNAL MEDICINE

## 2024-12-03 PROCEDURE — 85018 HEMOGLOBIN: CPT | Performed by: INTERNAL MEDICINE

## 2024-12-03 PROCEDURE — 36415 COLL VENOUS BLD VENIPUNCTURE: CPT | Performed by: INTERNAL MEDICINE

## 2024-12-03 PROCEDURE — G0439 PPPS, SUBSEQ VISIT: HCPCS | Performed by: INTERNAL MEDICINE

## 2024-12-03 PROCEDURE — 99213 OFFICE O/P EST LOW 20 MIN: CPT | Mod: 25 | Performed by: INTERNAL MEDICINE

## 2024-12-03 RX ORDER — ROSUVASTATIN CALCIUM 10 MG/1
10 TABLET, COATED ORAL DAILY
Qty: 90 TABLET | Refills: 3 | Status: SHIPPED | OUTPATIENT
Start: 2024-12-03

## 2024-12-03 SDOH — HEALTH STABILITY: PHYSICAL HEALTH: ON AVERAGE, HOW MANY DAYS PER WEEK DO YOU ENGAGE IN MODERATE TO STRENUOUS EXERCISE (LIKE A BRISK WALK)?: 5 DAYS

## 2024-12-03 ASSESSMENT — SOCIAL DETERMINANTS OF HEALTH (SDOH): HOW OFTEN DO YOU GET TOGETHER WITH FRIENDS OR RELATIVES?: TWICE A WEEK

## 2024-12-03 ASSESSMENT — PAIN SCALES - GENERAL: PAINLEVEL_OUTOF10: NO PAIN (0)

## 2024-12-03 NOTE — PROGRESS NOTES
Preventive Care Visit  [facility]  Storm Tamayo MD, Internal Medicine  Dec 3, 2024      Assessment & Plan     (Z00.00) Medicare annual wellness visit, subsequent  (primary encounter diagnosis)  Comment: Discussed cardiac disease risk factors and cardiac disease risk factor modification, including diabetes screening, blood pressure screening (and management if indicated), and cholesterol screening.   Reviewed immunzation guidelines, including pneumococcal vaccines, annual influenza, and shingles vaccines.   Discussed routine cancer screenings, including skin cancer, colon cancer screening for everyone until age 80, prostate cancer screening in men until age 75, mammogram and PAP/pelvic for women until age 75.   Recommended regular dentist visits to care for remaining teeth.   Recommended regular screening for vision and glaucoma.   Recommended safe driving and accident avoidance.    Counseling:    Reviewed preventive health counseling, as reflected in patient instructions       Regular exercise       Healthy diet/nutrition       Vision screening       Hearing screening       Immunizations  up to date, except consider RSV, but get this from pharmacy due to Medicare coverage         Colorectal cancer screening       Prostate cancer screening         Patient has been advised of split billing requirements and indicates understanding: Yes   Plan: REVIEW OF HEALTH MAINTENANCE PROTOCOL ORDERS            (E78.5) Hyperlipidemia LDL goal <130  Comment: This condition is currently controlled on the current medical regimen.  Continue current therapy.   Plan: Lipid panel reflex to direct LDL Non-fasting,         REVIEW OF HEALTH MAINTENANCE PROTOCOL ORDERS,         rosuvastatin (CRESTOR) 10 MG tablet, Basic         metabolic panel, Hemoglobin            (Z13.6) CARDIOVASCULAR SCREENING; LDL GOAL LESS THAN 130  Comment: Discussed cardiac disease risk factors and cardiac disease risk factor  "modification.   Plan: Lipid panel reflex to direct LDL Non-fasting,         REVIEW OF HEALTH MAINTENANCE PROTOCOL ORDERS,         Basic metabolic panel, Hemoglobin            (Z12.5) Screening for prostate cancer  Comment: Discussed prostate cancer screening and PSA blood test.  Discussed that an elevated PSA blood test can be caused by things other than prostate cancer, including infection/inflammation, BPH, and recent sexual activity; and that elevated PSA blood test may require further investigation with a urologist   Plan: REVIEW OF HEALTH MAINTENANCE PROTOCOL ORDERS,         PSA, screen            (Z13.6) Screening for AAA (abdominal aortic aneurysm)  Comment:   Plan: [place] Aorta Medicare AAA Screening               Patient has been advised of split billing requirements and indicates understanding: Yes        BMI  Estimated body mass index is 28.19 kg/m  as calculated from the following:    Height as of this encounter: 1.753 m (5' 9\").    Weight as of this encounter: 86.6 kg (190 lb 14.4 oz).       Counseling  Appropriate preventive services were addressed with this patient via screening, questionnaire, or discussion as appropriate for fall prevention, nutrition, physical activity, Tobacco-use cessation, social engagement, weight loss and cognition.  Checklist reviewing preventive services available has been given to the patient.  Reviewed patient's diet, addressing concerns and/or questions.   The patient reports drinking more than 3 alcoholic drinks per day and/or more than 7 drhnks per week. The patient was counseled and given information about possible harmful effects of excessive alcohol intake.Patient reported safety concerns were addressed today.        Subjective   Pat is a 69 year old, presenting for the following:  Medicare Visit        12/3/2024     8:23 AM   Additional Questions   Roomed by Angeles CRUZ CMA   HPI      1.)  Hyperlipidemia:  Has history of hyperlipidemia.    The patient is taking a " medication for this.  Denies any significant side effects from his medication.      Latest labs reviewed:    Recent Labs   Lab Test 11/21/23  0921 11/10/22  0833   CHOL 139 139   HDL 67 54   LDL 59 69   TRIG 64 82        Lab Results   Component Value Date    AST 26 10/26/2021    AST 30 10/21/2020         Health Care Directive  Patient does not have a Health Care Directive: Discussed advance care planning with patient; however, patient declined at this time.      12/3/2024   General Health   How would you rate your overall physical health? Excellent   Feel stress (tense, anxious, or unable to sleep) Not at all            12/3/2024   Nutrition   Diet: Regular (no restrictions)            12/3/2024   Exercise   Days per week of moderate/strenous exercise 5 days            12/3/2024   Social Factors   Frequency of gathering with friends or relatives Twice a week   Worry food won't last until get money to buy more No   Food not last or not have enough money for food? No   Do you have housing? (Housing is defined as stable permanent housing and does not include staying ouside in a car, in a tent, in an abandoned building, in an overnight shelter, or couch-surfing.) Yes   Are you worried about losing your housing? No   Lack of transportation? No   Unable to get utilities (heat,electricity)? No            12/3/2024   Fall Risk   Fallen 2 or more times in the past year? No     No    Trouble with walking or balance? No     No        Patient-reported    Multiple values from one day are sorted in reverse-chronological order          12/3/2024   Activities of Daily Living- Home Safety   Needs help with the following daily activites None of the above   Safety concerns in the home Throw rugs in the hallway    No grab bars in the bathroom       Multiple values from one day are sorted in reverse-chronological order         12/3/2024   Dental   Dentist two times every year? Yes            12/3/2024   Hearing Screening   Hearing  concerns? None of the above            12/3/2024   Driving Risk Screening   Patient/family members have concerns about driving No            12/3/2024   General Alertness/Fatigue Screening   Have you been more tired than usual lately? No            12/3/2024   Urinary Incontinence Screening   Bothered by leaking urine in past 6 months No            12/3/2024   TB Screening   Were you born outside of the [place]? No            Today's PHQ-2 Score:       12/3/2024     8:13 AM   PHQ-2 ( 1999 Pf[employer])   Q1: Little interest or pleasure in doing things 0    Q2: Feeling down, depressed or hopeless 0    PHQ-2 Score 0    Q1: Little interest or pleasure in doing things Not at all   Q2: Feeling down, depressed or hopeless Not at all   PHQ-2 Score 0       Patient-reported           12/3/2024   Substance Use   Alcohol more than 3/day or more than 7/wk Yes   How often do you have a drink containing alcohol 4 or more times a week   How many alcohol drinks on typical day 1 or 2   How often do you have 5+ drinks at one occasion Never   Audit 2/3 Score 0   How often not able to stop drinking once started Never   How often failed to do what normally expected Never   How often needed first drink in am after a heavy drinking session Never   How often feeling of guilt or remorse after drinking Never   How often unable to remember what happened the night before Never   Have you or someone else been injured because of your drinking No   Has anyone been concerned or suggested you cut down on drinking No   TOTAL SCORE - AUDIT 4   Do you have a current opioid prescription? No   How severe/bad is pain from 1 to 10? 0/10 (No Pain)   Do you use any other substances recreationally? No        Social History     Tobacco Use    Smoking status: Former     Current packs/day: 0.00     Types: Cigarettes     Start date: 1/1/1978     Quit date: 1/1/2009     Years since quitting: 15.9    Smokeless tobacco: Never    Tobacco comments:     former smoker, 1/4-1/2  ppd off an on ages [age]   Substance Use Topics    Alcohol use: Yes    Drug use: Never       Last PSA:   PSA   Date Value Ref Range Status   10/21/2020 1.11 0 - 4 ug/L Final     Comment:     Assay Method:  Chemiluminescence using Siemens Vista analyzer     Prostate Specific Antigen Screen   Date Value Ref Range Status   11/21/2023 1.73 0.00 - 4.50 ng/mL Final   11/10/2022 1.00 0.00 - 4.00 ug/L Final               Reviewed and updated as needed this visit by Provider   Tobacco   Meds                **I reviewed the information recorded in the patient's EPIC chart (including but not limited to medical history, surgical history, family history, problem list, medication list, and allergy list) and updated the information as indicated based on the patients reported information.         Current providers sharing in care for this patient include:  Patient Care Team:  Storm Tamayo MD as PCP - General (Internal Medicine)  Storm Tamayo MD as Assigned PCP  Olu Vincent MD as Assigned Neuroscience Provider  Miriam Vale, PhD LP as Assigned Behavioral Health Provider    The following health maintenance items are reviewed in Epic and correct as of today:  Health Maintenance   Topic Date Due    AORTIC ANEURYSM SCREENING (SYSTEM ASSIGNED)  Never done    LIPID  11/21/2024    MEDICARE ANNUAL WELLNESS VISIT  12/03/2025    ANNUAL REVIEW OF HM ORDERS  12/03/2025    FALL RISK ASSESSMENT  12/03/2025    DTAP/TDAP/TD IMMUNIZATION (3 - Td or Tdap) 08/29/2026    GLUCOSE  11/21/2026    ADVANCE CARE PLANNING  11/21/2028    COLORECTAL CANCER SCREENING  01/16/2031    RSV VACCINE (1 - 1-dose 75+ series) 01/24/2031    HEPATITIS C SCREENING  Completed    PHQ-2 (once per calendar year)  Completed    INFLUENZA VACCINE  Completed    Pneumococcal Vaccine: 65+ Years  Completed    ZOSTER IMMUNIZATION  Completed    COVID-19 Vaccine  Completed    HPV IMMUNIZATION  Aged Out    MENINGITIS IMMUNIZATION  Aged Out    RSV  "MONOCLONAL ANTIBODY  Aged Out         Review of Systems  Constitutional, neuro, ENT, endocrine, pulmonary, cardiac, gastrointestinal, genitourinary, musculoskeletal, integument and psychiatric systems are negative, except as otherwise noted.     Objective    Exam  /76   Pulse 67   Temp 97.6  F (36.4  C)   Resp 15   Ht 1.753 m (5' 9\")   Wt 86.6 kg (190 lb 14.4 oz)   SpO2 98%   BMI 28.19 kg/m     Estimated body mass index is 28.19 kg/m  as calculated from the following:    Height as of this encounter: 1.753 m (5' 9\").    Weight as of this encounter: 86.6 kg (190 lb 14.4 oz).    Physical Exam  GENERAL alert and no distress  EYES:  Normal sclera,conjunctiva, EOMI  HENT: oral and posterior pharynx without lesions or erythema, facies symmetric  NECK: Neck supple. No LAD, without thyroidmegaly.  RESP: Clear to ausculation bilaterally without wheezes or crackles. Normal BS in all fields.  CV: RRR normal S1S2 without murmurs, rubs or gallops.  LYMPH: no cervical lymph adenopathy appreciated  MS: extremities- no gross deformities of the visible extremities noted,   EXT:  no lower extremity edema  PSYCH: Alert and oriented times 3; speech- coherent  SKIN:  No obvious significant skin lesions on visible portions of face          12/3/2024   Mini Cog   Clock Draw Score 2 Normal   3 Item Recall 2 objects recalled   Mini Cog Total Score 4                 Signed Electronically by: Storm Tamayo MD    "

## 2024-12-03 NOTE — PATIENT INSTRUCTIONS
"     RSV vaccines are now available.  The will help provide protection against respiratory syncytial virus (RSV), a common upper respiratory virus that is known to cause severe inflammation in the airways.  This vaccine is recommended for adults over age 60, especially those with high risk conditions and/or chronic respiratory illnesses such as asthma or COPD.  This vaccine is available at most pharmacies and clinics.    If you are on Medicare insurance, get this vaccine from a pharmacist in a pharmacy for the best cost and to confirm coverage, it will be less expensive to get this there rather than from our clinic.        All other vaccines up to date.  Get the flu shot and most updated Covid booster each fall.        We are rechecking your labs.  I will let you know if the results indicate any changes in your therapy.  Unless you hear otherwise, continue all medications at the same doses.  Contact your usual pharmacy if you need refills.     Assuming your labs look good, then continue all medications at the same doses.  Contact your usual pharmacy if you need refills.           CERUMEN IMPACTION (EXCESSIVE EAR WAX BUILD UP):       *  You had a decent amount of wax in the left ear canal.     *  Wax softening ear drops (e.g. Debrox or mineral oil) into the affected ear canal, let it sit for 15 minutes, then irrigate with the syringe as below.     *  In the future, never use Q-tips or swabs to try and clean out your ear canal as you will risk injury to the ear canal or ear drum and you will do nothing more than just push the wax in deeper into the erar canal.     *  If you have future problems with ear wax build up, perform regular ear canal irrigation using either a \"bulb syringe\" from any drug store or the \"elephant ear wash kit\" (available on [employer]) and perform your own ear canal washes every 2-3 months if needed.  Use luke warm water ( not too hot, not too cold)            For more vigorous irrigation:  consider " "Elephant Bottle Ear Wash system (get from [employer]):            5 GOALS TO PREVENT VASCULAR DISEASE:         1.  Aggressive blood pressure control, under 130/80 ideally.  Using medications if needed.    Your blood pressure is under good control    BP Readings from Last 4 Encounters:   12/03/24 130/76   11/21/23 134/74   11/10/22 130/70   12/08/21 138/63       2.  Aggressive LDL cholesterol (\"bad cholesterol\") lowering as indicated.    Your goal is an LDL under 130 for sure, preferably under 100.  (If you have diabetes or previous vascular disease, the the LDL goals would be under 100 for sure, preferably under 70.)    New guidelines identify four high-risk groups who could benefit from statins:   *people with pre-existing heart disease, such as those who have had a heart attack;   *people ages 40 to 75 who have diabetes of any type  *patients ages 40 to 75 with at least a 7.5% risk of developing cardiovascular disease over the next decade, according to a formula described in the guidelines  *patients with the sort of super-high cholesterol that sometimes runs in families, as evidenced by an LDL of 190 milligrams per deciliter or higher    Your cholesterol levels are well controlled.    Recent Labs   Lab Test 11/21/23  0921 11/10/22  0833   CHOL 139 139   HDL 67 54   LDL 59 69   TRIG 64 82       --LDL (\"bad\" cholesterol): normal under 130, ideal under 100.  Best way to lower this is through better food choices ( lower fats, etc.), medication may be considered if indicated  --HDL (\"good\") cholesterol: (normal above 40 for men, above 50 for women).  Best way to improve the HDL level is through regular physical exercise.  There are no medications to raise HDL levels.   --Triglycerides (desirable under 150): triglycerides are more about metabolic issues, best way to improve this is through better diet choices, emphasizing reducing the intake of \"simple carbohydrates\" (e.g. White bread, white rice, pasta, noodles, potatoes, " snack foods, regular soda, juices (except fresh squeezed), cakes, cookies, candy, etc.) as best possible. Medications may be considered for triglyceride levels routinely above 400.    3.  Aggressive diabetic prevention, screening and/or management.      You do not have diabetes as of the most recent blood tests.     4.  No smoking    5.  Consider daily preventative aspirin over age 50 if you have enough cardiac risk factors to place you at higher risk for the presence of vascular disease.    If you have any reason not to take aspirin such easy bruising or bleeding, stomach problems, other anticoagulant medications, or any other side effects, then you should not take Aspirin.     --Based on your current cardiac disease risk profile and/or age over 75, you do NOT need to take daily preventative aspirin.        Preventive Health Recommendations:   Male Ages 65 - 75    Yearly exam:             See your health care provider every year in order to  o   Review health changes.   o   Discuss preventive care.    o   Review your medicines if your doctor has prescribed any.  Talk with your health care provider about whether you should have a test to screen for prostate cancer (PSA).  Every 3 years, have a diabetes test (fasting glucose). If you are at risk for diabetes, you should have this test more often.  At least Every 5 years, have a cholesterol test. Have this test more often if you have medical conditions that place you at higher risk for high cholesterol or heart disease.   Regular colon cancer screening starting age 45 with either a colonoscopy, or stool test (either ColoGuard every 3 years or yearly FIT stool test from us).  The intervals for colon cancer screening will be determined by family history and prior colon cancer screening results.   Routine prostate cancer screening with a PSA blood test every 1-2 years.   While the PSA blood test is not a direct cancer screening blood test, it detects inflammation within  in the prostate, which could indicate possible cancer.  If the test is abnormal, you do not necessarily have prostate cancer, but would need further evaluation.    Talk to with your health care provider about screening for Abdominal Aortic Aneurysm if you have a family history of AAA or have a history of smoking.    Vaccine recommendations:   Get a flu shot each fall.  Middle October is the optimal time to receive the flu shot.   Covid vaccines are now recommended annually.  Get the most updated Covid vaccine when it becomes available, consider getting this at the same time as the annual influenza vaccine.   Get a tetanus shot every 10 years. (Get this vaccine from a pharmacist in a pharmacy when you are over 65 on Medicare insurance)  Everyone over 65 should make sure to receive pneumonia vaccines to prevent the most common type of bacterial pneumonia: Pneumococcal pneumonia. There are now two you should receive - Pneumovax (PPSV 23) and Prevnar (PCV 20)  Strongly consider the Shingrix shingles vaccine to give you the best chance of avoiding future shingles infection (as many as 1 and 3 adults over age 50 may develop this condition in their lifetime).      --If you have Medicare insurance, investigate the cost and coverage for Shingrix shingles vaccines with a pharmacist at a pharmacy.  They can tell you the coverage and cost and then give it to you if the price is acceptable.    --Medicare sometimes does not cover these Shingrix shingles vaccines, and with Medicare insurance it is usually cheaper to receive this shingles vaccine from a pharmacist in a pharmacy rather than in our clinic.    --At this time, you only need the 2 Shingrix vaccines and then you are done.    Consider vaccination against Respiratory Syncytial Virus (RSV) infections, especially if you have active lung disease, history of smoking, and/or cardiac conditions.  The respiratory syncytial virus (RSV), is a common upper respiratory virus that  "causes severe inflammation in the airways, more than most upper respiratory viruses.   This vaccine is available at most pharmacies and clinics.  At this time, the RSV vaccine is a one time vaccine.    --If you are on Medicare insurance, you need to specifically get this vaccine from a pharmacist in a pharmacy for the best cost and to confirm coverage, it will be less expensive to get this there rather than from our clinic.     Nutrition:   Eat at least 5 servings of fruits and vegetables each day.   Eat whole-grain bread, whole-wheat pasta and brown rice instead of white grains and rice.   Talk to your provider about Calcium and Vitamin D.      --Good Grains:  Oats, brown rice, Quinoa (these do not raise the blood sugar as much)     --Bad grains:  Anything made from wheat or white rice     (because these raise the blood sugars significantly, and the possible gluten issue from wheat for some people).      --Proteins:  Aim for \"lean proteins\" including chicken, fish, seafood, pork, turkey, and eggs (in moderation); Eat red meat only occasionally        Abiel Tan:                  Lifestyle  Exercise for at least 150 minutes a week (30 minutes a day, 5 days a week). This will help you control your weight and prevent disease.   Limit alcohol to one drink per day.   No smoking.   Wear sunscreen to prevent skin cancer.   See your dentist every six months for an exam and cleaning.   See your eye doctor every 1 to 2 years to screen for conditions such as glaucoma, macular degeneration, cataracts, etc     "

## 2025-01-03 ENCOUNTER — ANCILLARY PROCEDURE (OUTPATIENT)
Dept: ULTRASOUND IMAGING | Facility: CLINIC | Age: 70
End: 2025-01-03
Attending: INTERNAL MEDICINE
Payer: COMMERCIAL

## 2025-01-03 DIAGNOSIS — Z13.6 SCREENING FOR AAA (ABDOMINAL AORTIC ANEURYSM): ICD-10-CM

## 2025-01-03 PROCEDURE — 76706 US ABDL AORTA SCREEN AAA: CPT

## 2025-02-17 DIAGNOSIS — E78.5 HYPERLIPIDEMIA LDL GOAL <130: ICD-10-CM

## 2025-02-17 RX ORDER — ROSUVASTATIN CALCIUM 10 MG/1
10 TABLET, COATED ORAL DAILY
Qty: 90 TABLET | Refills: 0 | Status: SHIPPED | OUTPATIENT
Start: 2025-02-17

## 2025-02-17 NOTE — TELEPHONE ENCOUNTER
Prescription approved per [facility] Refill Protocol.  Emily Denton RN  [facility]

## 2025-04-05 ENCOUNTER — HEALTH MAINTENANCE LETTER (OUTPATIENT)
Age: 70
End: 2025-04-05